# Patient Record
Sex: FEMALE | Race: WHITE | NOT HISPANIC OR LATINO | Employment: OTHER | URBAN - METROPOLITAN AREA
[De-identification: names, ages, dates, MRNs, and addresses within clinical notes are randomized per-mention and may not be internally consistent; named-entity substitution may affect disease eponyms.]

---

## 2017-03-23 ENCOUNTER — TRANSCRIBE ORDERS (OUTPATIENT)
Dept: ADMINISTRATIVE | Facility: HOSPITAL | Age: 57
End: 2017-03-23

## 2017-03-23 DIAGNOSIS — Z12.31 SCREENING MAMMOGRAM, ENCOUNTER FOR: Primary | ICD-10-CM

## 2017-04-27 ENCOUNTER — ALLSCRIPTS OFFICE VISIT (OUTPATIENT)
Dept: OTHER | Facility: OTHER | Age: 57
End: 2017-04-27

## 2017-05-09 ENCOUNTER — ALLSCRIPTS OFFICE VISIT (OUTPATIENT)
Dept: OTHER | Facility: OTHER | Age: 57
End: 2017-05-09

## 2017-05-09 ENCOUNTER — LAB REQUISITION (OUTPATIENT)
Dept: LAB | Facility: HOSPITAL | Age: 57
End: 2017-05-09
Payer: COMMERCIAL

## 2017-05-09 DIAGNOSIS — Z12.4 ENCOUNTER FOR SCREENING FOR MALIGNANT NEOPLASM OF CERVIX: ICD-10-CM

## 2017-05-09 DIAGNOSIS — Z12.31 ENCOUNTER FOR SCREENING MAMMOGRAM FOR MALIGNANT NEOPLASM OF BREAST: ICD-10-CM

## 2017-05-09 DIAGNOSIS — Z11.51 ENCOUNTER FOR SCREENING FOR HUMAN PAPILLOMAVIRUS (HPV): ICD-10-CM

## 2017-05-09 PROCEDURE — 87624 HPV HI-RISK TYP POOLED RSLT: CPT | Performed by: OBSTETRICS & GYNECOLOGY

## 2017-05-09 PROCEDURE — G0145 SCR C/V CYTO,THINLAYER,RESCR: HCPCS | Performed by: OBSTETRICS & GYNECOLOGY

## 2017-05-10 ENCOUNTER — HOSPITAL ENCOUNTER (OUTPATIENT)
Dept: MAMMOGRAPHY | Facility: MEDICAL CENTER | Age: 57
Discharge: HOME | End: 2017-05-10
Payer: COMMERCIAL

## 2017-05-10 DIAGNOSIS — Z12.31 ENCOUNTER FOR SCREENING MAMMOGRAM FOR MALIGNANT NEOPLASM OF BREAST: ICD-10-CM

## 2017-05-10 PROCEDURE — G0202 SCR MAMMO BI INCL CAD: HCPCS

## 2017-05-12 LAB — HPV RRNA GENITAL QL NAA+PROBE: NORMAL

## 2017-05-17 LAB
LAB AP GYN PRIMARY INTERPRETATION: NORMAL
Lab: NORMAL

## 2017-10-19 ENCOUNTER — GENERIC CONVERSION - ENCOUNTER (OUTPATIENT)
Dept: OTHER | Facility: OTHER | Age: 57
End: 2017-10-19

## 2017-10-24 ENCOUNTER — GENERIC CONVERSION - ENCOUNTER (OUTPATIENT)
Dept: OTHER | Facility: OTHER | Age: 57
End: 2017-10-24

## 2017-10-30 ENCOUNTER — ALLSCRIPTS OFFICE VISIT (OUTPATIENT)
Dept: OTHER | Facility: OTHER | Age: 57
End: 2017-10-30

## 2017-10-31 NOTE — PROGRESS NOTES
Assessment  1  SVT (supraventricular tachycardia) (427 89) (I47 1)   2  Hypertension (401 9) (I10)    Plan  Hypertension, SVT (supraventricular tachycardia)    · *1 - [facility] (CARDIOLOGY ) Co-Management  *  Status: Active   Requested for: 25XBC8661  Care Summary provided  : Yes   · *VB - Follow-up With Nurse or MA For BP Check Evaluation and Treatment  Follow-up   Status: Hold For - Scheduling  Requested for: 77CQD2456    Discussion/Summary    The patient appears to be doing well currently at this point since her episode of SVT last week  She was directed to continue with the amlodipine/benazepril 10/20 milligrams daily in the evening as well as the metoprolol 50 milligrams daily in the morning  She was encouraged to check her blood pressure at home as well as her pulse is 1-2 times daily until she returns next week for another blood pressure check  I reviewed with her that I would like to make sure that her blood pressure remains well controlled and does not go too low with the combination of both medications  She was encouraged to call with any problems or concerns prior to her visit next week  She was given a referral to cardiology at [facility] as she would prefer to follow up there over [facility]  Possible side effects of new medications were reviewed with the patient/guardian today  The treatment plan was reviewed with the patient/guardian  The patient/guardian understands and agrees with the treatment plan      Chief Complaint  Followup form [facility] emergency room  History of Present Illness  The patient presents today for follow-up from her ER visit  She went to the ER of [facility] on 10/24/17  She went for palpitations and her heart rate was about 230  She was given a dose of adenosine and her heart rate was down to 115  She had normal labs while she was in the emergency room including a normal magnesium, and TSH  She was not anemic   Her BMP was normal besides a potassium of 3 4  She had a negative urine pregnancy test and no evidence of a urinary tract infection  The She notes that she was given a script for metoprolol 50 mg ER daily and told to continue with the amlodipine that they thought she was taking  She notes that she saw on the discharge papers though that she was supposed to stop the amlodipine so she is confused  She states that she took her Lotrel last night and this morning took the Lopressor when she was having palpitations  She is unsure if she should continue both though  She also notes that she has an appointment with  cardiology but would prefer to be seen [facility] cardio  Review of Systems    Constitutional: not feeling poorly  Cardiovascular: no chest pain-- and-- no lower extremity edema--    The patient presents with complaints of palpitations (over the weekend had slightly but improved with medication)  Respiratory: no shortness of breath  Neurological: no headache  Active Problems  1  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)   2  Breast self examination education, encounter for (V65 49) (Z71 89)   3  Cervical cancer screening (V76 2) (Z12 4)   4  Cystocele, midline (618 01) (N81 11)   5  Dyspareunia (625 0)   6  Encounter for routine gynecological examination with Papanicolaou smear of cervix   (V72 31,V76 2) (Z01 419)   7  Encounter for screening mammogram for malignant neoplasm of breast (V76 12)   (Z12 31)   8  Headache, acute (784 0) (R51)   9  History of self breast exam   10  Hyperlipidemia (272 4) (E78 5)   11  Hypertension (401 9) (I10)   12  Screening for HPV (human papillomavirus) (V73 81) (Z11 51)   13  Seborrheic keratosis (702 19) (L82 1)   14  Tinnitus, right ear (388 30) (H93 11)   15  Urinary incontinence (788 30) (R32)   16  Uterovaginal prolapse (618 4) (N81 4)    Past Medical History  1  Breast self examination education, encounter for (V65 49) (Z71 89)   2   History of Encounter for screening colonoscopy (V76 51) (Z12 11)   3  History of  3 (V22 2) (Z33 1)   4  History of uterine leiomyoma (V13 29) (Z86 018)   5  History of Need for revaccination (V05 9) (Z23)   6  History of Need for Tdap vaccination (V06 1) (Z23)   7  Personal history of renal calculi (V13 01) (Z87 442)   8  History of Spontaneous , complete, without mention of complication (873 34)   (O03 9)   9  History of Visit for gynecologic examination (V72 31) (Z01 419)    Surgical History  1  History of Dilation And Curettage   2  History of Hysteroscopy   3  History of Laparoscopy (Diagnostic)   4  History of Oral Surgery Tooth Extraction   5  History of Salpingo-oophorectomy Left Side    Family History  Mother    1  Family history of Diabetes Mellitus (V18 0)   2  Family history of hypertension (V17 49) (Z82 49)  Father    3  Family history of hypertension (V17 49) (Z82 49)  Sister    4  Family history of hypertension (V17 49) (Z82 49)  Brother    5  Family history of hypertension (V17 49) (Z82 49)  Maternal Aunt    6  Family history of Malignant neoplasm of breast, unspecified laterality  Maternal Cousin    7  Family history of breast cancer (V16 3) (Z80 3)  Family History    8  Family history of Hypertension (V17 49)    Social History   · Being A Social Drinker   · Caffeine Use   · Denied: History of Drug use   · Former smoker (V15 82) (N51 707)   · Marital History - Currently    · Spiritism Affiliation [employer]   · Two children   · Uses Safety Equipment - Seatbelts    Current Meds   1  Amlodipine Besy-Benazepril HCl - 10-20 MG Oral Capsule; take 1 capsule daily; Therapy: 21DEH8631 to ([phone])  Requested for: 2017; Last   Rx:2017 Ordered   2  Amlodipine Besy-Benazepril HCl - 5-10 MG Oral Capsule; TAKE 1 CAPSULE EVERY DAY    Requested for: 2017; Last Rx:2017 Ordered   3  Multivitamins TABS; Therapy: (Recorded:2014) to Recorded   4  Simvastatin 20 MG Oral Tablet; Take 1 tablet daily;    Therapy: 63UGB6159 to (Evaluate:24Apr2018)  Requested for: 26Oct2017; Last   Rx:26Oct2017 Ordered   5  Toprol XL 50 MG Oral Tablet Extended Release 24 Hour; take 1 tablet every day; Therapy: (Recorded:30Oct2017) to Recorded    Allergies  1  No Known Drug Allergies  2  Latex    Vitals  Vital Signs    Recorded: 21HPU1991 03:01PM Recorded: 43TNS5194 02:39PM   Heart Rate 72 80   Systolic 311 778   Diastolic 82 80   Height  5 ft 1 5 in   Weight  141 lb 8 oz   BMI Calculated  26 3   BSA Calculated  1 64     Physical Exam    Constitutional   General appearance: No acute distress, well appearing and well nourished  Neck   Neck: Supple, symmetric, trachea midline, no masses  Thyroid: Normal, no thyromegaly  Pulmonary   Respiratory effort: No increased work of breathing or signs of respiratory distress  Auscultation of lungs: Clear to auscultation  Cardiovascular   Auscultation of heart: Normal rate and rhythm, normal S1 and S2, no murmurs  Peripheral vascular exam: Normal   Carotid: no bruit on the right-- and-- no bruit on the left  Radial: right 2+-- and-- left 2+  Posterior tibialis: right 2+-- and-- left 2+  Examination of extremities for edema and/or varicosities: Normal   no edema  Lymphatic   Palpation of lymph nodes in neck: No lymphadenopathy      Psychiatric   Mood and affect: Normal        Future Appointments    Date/Time Provider Specialty Site   11/06/2017 01:30 PM [place] Nurse Schedule  [address] [facility]     Signatures   Electronically signed by : Stiven Morales [facility]; Oct 30 2017  8:12PM EST                       (Author)    Electronically signed by : MOMO Melo ; Oct 30 2017 10:24PM EST

## 2017-11-06 ENCOUNTER — ALLSCRIPTS OFFICE VISIT (OUTPATIENT)
Dept: OTHER | Facility: OTHER | Age: 57
End: 2017-11-06

## 2017-12-06 ENCOUNTER — ALLSCRIPTS OFFICE VISIT (OUTPATIENT)
Dept: OTHER | Facility: OTHER | Age: 57
End: 2017-12-06

## 2017-12-06 ENCOUNTER — TRANSCRIBE ORDERS (OUTPATIENT)
Dept: ADMINISTRATIVE | Facility: HOSPITAL | Age: 57
End: 2017-12-06

## 2017-12-06 DIAGNOSIS — I50.32 CHRONIC DIASTOLIC HEART FAILURE (HCC): ICD-10-CM

## 2017-12-06 DIAGNOSIS — I10 ESSENTIAL HYPERTENSION, MALIGNANT: Primary | ICD-10-CM

## 2017-12-06 DIAGNOSIS — E78.5 HYPERLIPIDEMIA, UNSPECIFIED HYPERLIPIDEMIA TYPE: ICD-10-CM

## 2017-12-07 NOTE — CONSULTS
Assessment    1  SVT (supraventricular tachycardia) (427 89) (I47 1)   2  Hyperlipidemia (272 4) (E78 5)   3  Hypertension (401 9) (I10)    Plan  Hypertension    · Metoprolol Succinate ER 25 MG Oral Tablet Extended Release 24 Hour; TAKE 1TABLET DAILY   Rx By: Daphne Barrios; Dispense: 90 Days ; #:90 Tablet Extended Release 24 Hour; Refill: 3;For: Hypertension; MADISYN = N; Verified Transmission to [facility] 5496; Last Updated By: System, [employer]; 12/6/2017 9:16:49 AM   · Amlodipine Besy-Benazepril HCl - 10-20 MG Oral Capsule; take 1 capsule daily   Rx By: Daphne Barrios; Dispense: 90 Days ; #:90 Capsule; Refill: 3;For: Hypertension; MADISYN = N; Verified Transmission to [facility]); Last Updated By: System, SureScript[employer]; 12/6/2017 9:16:49 AM   · EKG/ECG- POC; Status:Complete;   Done: 92AFR8810 08:26AM   Perform: In Office; Last Updated Kayley Lopez; 12/6/2017 8:26:34 AM;Ordered;Ordered By:Tony Christie;  SVT (supraventricular tachycardia)    · ECHO STRESS TEST W CONTRAST IF INDICATED; Status:Need Information - FinancialAuthorization; Requested for:56Uzn2289 08:00AM;    Perform:[facility]; JUDE:[id number]; Last Updated By:Mariel Hays; 12/6/2017 9:32:00 AM;Ordered;(supraventricular tachycardia); Ordered By:Tony Christie;   · Follow-up visit in 6 months Evaluation and Treatment  Follow-up  Status: Complete Done: 12IJF6024   Ordered;SVT (supraventricular tachycardia); Ordered By: Daphne Barrios Performed:  Due: 02SKC3031; Last Updated By: Tiarra Colón; 12/6/2017 9:32:27 AM  Unlinked    · Toprol XL 50 MG Oral Tablet Extended Release 24 Hour (Metoprolol Succinate ER)   Dispense: 0 Days ; #: Sufficient Tablet Extended Release 24 Hour; Refill: 0; MADISYN = N; Record; Last Updated By: Daphne Barrios; 12/6/2017 9:11:47 AM    Discussion/Summary    1   SVT (records [facility] not available)  hypertension, controlled  hyperlipidemia on simvastatin  Will plan on obtaining records specifically ECG with SVT of the [facility] hospital  Presumptively, consistent with AVNRT  She has had no major symptoms requiring an ER visit in the past although she thinks she may have been added at the times before  result, will continue her current listed therapy  Discussed radiofrequency ablation should this become symptomatic or recur once again  In the meantime, echocardiogram and exercise treadmill testing will be performed and follow up arranged  Told to avoid dehydration, excessive alcohol and caffeine  Chief Complaint  New pt per pcp/ HTN      History of Present Illness  Cardiology HPI Free Text Note Form [facility]: Patient presents new patient evaluation for palpitations and reported SVT  presented [facility] with sudden onset of palpitations  She was diagnosed with SVT and given metoprolol and told to follow-up with a cardiologist   not have those records from that hospitalization but she states that she has had SVT any syncope times before  She thinks a total of 6   came on suddenly at work  It appeared to resolve with adenosine per noted her family doctor last month  has no prior cardiac history  She has a history of coronary artery disease  has not had a 2D echocardiogram in the past   does have a history of hypertension  She was on combination amlodipine and ACE inhibitor  Metoprolol recently started  states her blood pressure did get low when she went into SVT  did get lightheaded but did not have any saúl syncope  Review of Systems     Cardiac: palpitations present , but-- no chest pain,-- no rhythm problems,-- no fainting/blackouts,-- no heart murmur present-- and-- no signs of swelling  Skin: No complaints of nonhealing sores or skin rash    Genitourinary: No complaints of recurrent urinary tract infections, frequent urination at night, difficult urination, blood in urine, kidney stones, loss of bladder control, kidney problems, denies any birth control or hormone replacement, is not post menopausal, not currently pregnant  Psychological: No complaints of feeling depressed, anxiety, panic attacks, or difficulty concentrating  General: No complaints of trouble sleeping, lack of energy, fatigue, appetite changes, weight changes, fever, frequent infections, or night sweats  Respiratory: No complaints of shortness of breath, cough with sputum, or wheezing  HEENT: No complaints of serious problems, hearing problems, nose problems, throat problems, or snoring  Gastrointestinal: No complaints of liver problems, nausea, vomiting, heartburn, constipation, bloody stools, diarrhea, problems swallowing, adbominal pain, or rectal bleeding  Hematologic: No complaints of bleeding disorders, anemia, blood clots, or excessive brusing  Neurological: No complaints of numbness, tingling, dizziness, weakness, seizures, headaches, syncope or fainting, AM fatigue, daytime sleepiness, no witnessed apnea episodes  Musculoskeletal: arthritis, but-- no back pain-- and-- no swelling/pain      Active Problems    1  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)   2  Breast self examination education, encounter for (V65 49) (Z71 89)   3  Cervical cancer screening (V76 2) (Z12 4)   4  Cystocele, midline (618 01) (N81 11)   5  Dyspareunia (625 0)   6  Encounter for routine gynecological examination with Papanicolaou smear of cervix (V72 31,V76 2) (Z01 419)   7  Encounter for screening mammogram for malignant neoplasm of breast (V76 12) (Z12 31)   8  Headache, acute (784 0) (R51)   9  History of self breast exam   10  Hyperlipidemia (272 4) (E78 5)   11  Hypertension (401 9) (I10)   12  Screening for HPV (human papillomavirus) (V73 81) (Z11 51)   13  Seborrheic keratosis (702 19) (L82 1)   14  SVT (supraventricular tachycardia) (427 89) (I47 1)   15  Tinnitus, right ear (388 30) (H93 11)   16  Urinary incontinence (788 30) (R32)   17   Uterovaginal prolapse (618 4) (N81 4)    Past Medical History     · Breast self examination education, encounter for (V65 49) (Z71 89)   · History of Encounter for screening colonoscopy (V76 51) (Z12 11)   · History of  3 (V22 2) (Z33 1)   · History of uterine leiomyoma (V13 29) (Z86 018)   · History of Need for revaccination (V05 9) (Z23)   · History of Need for Tdap vaccination (V06 1) (Z23)   · Personal history of renal calculi (V13 01) (Z87 442)   · History of Spontaneous , complete, without mention of complication (692 08)(K27 6)   · History of Visit for gynecologic examination (V72 31) (Z01 419)    The active problems and past medical history were reviewed and updated today  Surgical History     · History of Dilation And Curettage   · History of Hysteroscopy   · History of Laparoscopy (Diagnostic)   · History of Oral Surgery Tooth Extraction   · History of Salpingo-oophorectomy Left Side    The surgical history was reviewed and updated today  Family History  Mother    · Family history of Diabetes Mellitus (V18 0)   · Family history of hypertension (V17 49) (Z82 49)  Father    · Family history of hypertension (V17 49) (Z82 49)  Sister    · Family history of hypertension (V17 49) (Z82 49)  Brother    · Family history of hypertension (V17 49) (Z82 49)  Maternal Aunt    · Family history of Malignant neoplasm of breast, unspecified laterality  Maternal Cousin    · Family history of breast cancer (V16 3) (Z80 3)  Family History    · Family history of Hypertension (V17 49)  Family History Reviewed: The family history was reviewed and updated today  Social History     · Being A Social Drinker   · Caffeine Use   · Denied: History of Drug use   · Former smoker (V15 82) (M87 145)   · Marital History - Currently    · [address]   · Two children   · Uses Safety Equipment - Seatbelts  The social history was reviewed and updated today  Current Meds   1   Amlodipine Besy-Benazepril HCl - 10-20 MG Oral Capsule; take 1 capsule daily; Therapy: 08ZIR0564 to ([phone])  Requested for: 26Oct2017; Last Rx:26Oct2017 Ordered   2  Multivitamins TABS; Therapy: (Recorded:02Jun2014) to Recorded   3  Simvastatin 20 MG Oral Tablet; Take 1 tablet daily; Therapy: 97UFV6774 to ([phone])  Requested for: 26Oct2017; Last Rx:26Oct2017 Ordered   4  Toprol XL 50 MG Oral Tablet Extended Release 24 Hour; TAKE 1/2 TABLET DAILY; Therapy: (450 74 295) to Recorded    The medication list was reviewed and updated today  Allergies  1  No Known Drug Allergies  2  Latex    Vitals  Signs     Heart Rate: 74  Pulse Quality: Normal, L Radial  Respiration Quality: Normal  Respiration: 16  Systolic: 340  Diastolic: 82  Height: 5 ft 1 5 in  Weight: 143 lb 2 oz  BMI Calculated: 26 61  BSA Calculated: 1 65    Physical Exam   Constitutional  General appearance: No acute distress, well appearing and well nourished  Eyes  Conjunctiva and Sclera examination: Conjunctiva pink, sclera anicteric  Ears, Nose, Mouth, and Throat - Oropharynx: Clear, nares are clear, mucous membranes are moist   Neck  Neck and thyroid: Normal, supple, trachea midline, no thyromegaly  Pulmonary  Respiratory effort: No increased work of breathing or signs of respiratory distress  Auscultation of lungs: Clear to auscultation, no rales, no rhonchi, no wheezing, good air movement  Cardiovascular  Auscultation of heart: Normal rate and rhythm, normal S1 and S2, no murmurs  Carotid pulses: Normal, 2+ bilaterally  Peripheral vascular exam: Normal pulses throughout, no tenderness, erythema or swelling  Pedal pulses: Normal, 2+ bilaterally  Examination of extremities for edema and/or varicosities: Normal    Abdomen  Abdomen: Non-tender and no distention  Liver and spleen: No hepatomegaly or splenomegaly  Musculoskeletal Gait and station: Normal gait  -- Digits and nails: Normal without clubbing or cyanosis  -- Inspection/palpation of joints, bones, and muscles: Normal, ROM normal    Skin - Skin and subcutaneous tissue: Normal without rashes or lesions  Skin is warm and well perfused, normal turgor  Neurologic - Cranial nerves: II - XII intact  -- Speech: Normal    Psychiatric - Orientation to person, place, and time: Normal -- Mood and affect: Normal       End of Encounter Meds    1  Simvastatin 20 MG Oral Tablet; Take 1 tablet daily; Therapy: 16YNQ5004 to (Osbaldo Irving)  Requested for: 26Oct2017; Last Rx:26Oct2017 Ordered    2  Amlodipine Besy-Benazepril HCl - 10-20 MG Oral Capsule; take 1 capsule daily; Therapy: 21YBR7222 to ([place])  Requested for: 30CGO0747; Last Rx:14Cbi2454 Ordered   3  Metoprolol Succinate ER 25 MG Oral Tablet Extended Release 24 Hour; TAKE 1 TABLET DAILY; Therapy: 13MJM1399 to ([place])  Requested for: 49XKP4054; Last Rx:82Qmo2129 Ordered    4  Multivitamins TABS;  Therapy: (Recorded:02Jun2014) to Recorded    Signatures   Electronically signed by : Jaye Pierson DO; Dec  6 2017  1:10PM EST                       (Author)

## 2017-12-22 ENCOUNTER — GENERIC CONVERSION - ENCOUNTER (OUTPATIENT)
Dept: CARDIOLOGY CLINIC | Facility: CLINIC | Age: 57
End: 2017-12-22

## 2017-12-22 ENCOUNTER — HOSPITAL ENCOUNTER (OUTPATIENT)
Dept: NON INVASIVE DIAGNOSTICS | Facility: CLINIC | Age: 57
Discharge: HOME | End: 2017-12-22
Payer: COMMERCIAL

## 2017-12-22 DIAGNOSIS — I47.1 SUPRAVENTRICULAR TACHYCARDIA (HCC): ICD-10-CM

## 2017-12-22 PROCEDURE — 93350 STRESS TTE ONLY: CPT

## 2017-12-27 ENCOUNTER — GENERIC CONVERSION - ENCOUNTER (OUTPATIENT)
Dept: OTHER | Facility: OTHER | Age: 57
End: 2017-12-27

## 2018-01-09 NOTE — PROGRESS NOTES
Chief Complaint  Blood pressure check  122/68  Pt stopped taking Toprol XL c/o low blood pressure and lightheaded  Pt took only 2 days  Pt would like to know if she should continue taking Toprol XL but lower dosage  Active Problems    1  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)   2  Breast self examination education, encounter for (V65 49) (Z71 89)   3  Cervical cancer screening (V76 2) (Z12 4)   4  Cystocele, midline (618 01) (N81 11)   5  Dyspareunia (625 0)   6  Encounter for routine gynecological examination with Papanicolaou smear of cervix   (V72 31,V76 2) (Z01 419)   7  Encounter for screening mammogram for malignant neoplasm of breast (V76 12)   (Z12 31)   8  Headache, acute (784 0) (R51)   9  History of self breast exam   10  Hyperlipidemia (272 4) (E78 5)   11  Hypertension (401 9) (I10)   12  Screening for HPV (human papillomavirus) (V73 81) (Z11 51)   13  Seborrheic keratosis (702 19) (L82 1)   14  SVT (supraventricular tachycardia) (427 89) (I47 1)   15  Tinnitus, right ear (388 30) (H93 11)   16  Urinary incontinence (788 30) (R32)   17  Uterovaginal prolapse (618 4) (N81 4)    Current Meds   1  Amlodipine Besy-Benazepril HCl - 10-20 MG Oral Capsule; take 1 capsule daily; Therapy: 07OLS5681 to (60 124 37 75)  Requested for: 26Oct2017; Last   Rx:26Oct2017 Ordered   2  Multivitamins TABS; Therapy: (Recorded:02Jun2014) to Recorded   3  Simvastatin 20 MG Oral Tablet; Take 1 tablet daily; Therapy: 26LGE3592 to (60 124 37 75)  Requested for: 26Oct2017; Last   Rx:26Oct2017 Ordered   4  Toprol XL 50 MG Oral Tablet Extended Release 24 Hour; take 1 tablet every day; Therapy: (Recorded:30Oct2017) to Recorded    Allergies    1  No Known Drug Allergies    2   Latex    Vitals  Signs    Systolic: 783, LUE, Sitting  Diastolic: 68, LUE, Sitting    Discussion/Summary    Decrease metoprolol succinate 50 milligrams to half tablet daily (for a total of 25 milligrams daily) and continue with the amlodipine benazepril 10/20 milligrams daily - return for blood pressure check in 1-2 weeks        Future Appointments    Date/Time Provider Specialty Site   12/06/2017 08:20 AM Roger Yeh,  Cardiology [address] [facility]     Signatures   Electronically signed by : Lara Murguia, [facility]; Nov 6 2017  6:25PM EST                       (Author)    Electronically signed by : MOMO Ennis ; Nov 6 2017  8:20PM EST

## 2018-01-13 VITALS
HEIGHT: 62 IN | DIASTOLIC BLOOD PRESSURE: 82 MMHG | HEART RATE: 72 BPM | WEIGHT: 141.5 LBS | SYSTOLIC BLOOD PRESSURE: 136 MMHG | BODY MASS INDEX: 26.04 KG/M2

## 2018-01-14 VITALS — SYSTOLIC BLOOD PRESSURE: 122 MMHG | DIASTOLIC BLOOD PRESSURE: 68 MMHG

## 2018-01-15 VITALS
DIASTOLIC BLOOD PRESSURE: 74 MMHG | WEIGHT: 138.25 LBS | HEIGHT: 62 IN | BODY MASS INDEX: 25.44 KG/M2 | SYSTOLIC BLOOD PRESSURE: 116 MMHG

## 2018-01-17 NOTE — PROGRESS NOTES
Assessment    1  Former smoker (V15 82) (P90 125)   2  Encounter for preventive health examination (V70 0) (Z00 00)   3  Hypertension (401 9) (I10)   4  Seborrheic keratosis (702 19) (L82 1)   5  Tinnitus, right ear (388 30) (H93 11)    Plan  Hyperlipidemia    · From  Lotrel 5-10 MG Oral Capsule  To Amlodipine Besy-Benazepril HCl -  5-10 MG Oral Capsule (Lotrel) TAKE 1 CAPSULE EVERY DAY  Need for revaccination    · Stop: Fluzone Quadrivalent 0 5 ML Intramuscular Suspension  Seborrheic keratosis    · 2 - Bayron Lou MD, Erlin Gilliland  (Dermatology) Co-Management  *  Status: Complete  Done:  27Apr2017  Care Summary provided  : Yes    Discussion/Summary  health maintenance visit Currently, she eats a healthy diet  Breast cancer screening: mammogram is current  Colorectal cancer screening: colorectal cancer screening is current  The immunizations are up to date  Samir Miller is here for health maintenance visit  She has been doing very well and has lost weight since last year  She is maintaining the weight loss with healthy diet and exercise  Blood pressure is well controlled on her current medication  She had a very good lipid profile last year, so we will recheck this in the future probably next year  She is having some clicking in the right ear intermittently  I recommended keeping a symptom diary of the symptoms and if it is worsening I will give her referral to ENT  She also has mild skin lesion in the center of the chest which is likely a seborrheic keratosis  There is a positive family history of melanoma in her father, and I've recommended dermatology referral   We'll plan yearly health maintenance visit  Chief Complaint  yearly - discuss clicking noise in right ear that occurs when she is laying down intermittently- also spot on middle of upper chest      History of Present Illness  HM, Adult Female: The patient is being seen for a health maintenance evaluation  The last health maintenance visit was 1 year(s) ago  General Health: The patient's health since the last visit is described as good  She has regular dental visits  She denies vision problems  She denies hearing loss  Immunizations status:  wears glasses for  Lifestyle:  She consumes a diverse and healthy diet  She exercises regularly  Reproductive health: the patient is postmenopausal    Screening:   HPI: yearly physical  Notes clicking in right ear when she lies down in quiet room for past 2 months  Sx is intermittent  no pain or hearing loss   no allergy sx or nasal congestion  post menopausal for over 10 yrs but still with hot flashes  Rare palpitations  Lost weight with healthy diet and exercise  last yr had tingling in hands subsided with B12      Review of Systems    Constitutional: No fever, no chills, feels well, no tiredness, no recent weight gain or weight loss  Eyes: No complaints of eye pain, no red eyes, no eyesight problems, no discharge, no dry eyes, no itching of eyes  ENT: as noted in HPI  Cardiovascular: No complaints of slow heart rate, no fast heart rate, no chest pain, no palpitations, no leg claudication, no lower extremity edema  Respiratory: No complaints of shortness of breath, no wheezing, no cough, no SOB on exertion, no orthopnea, no PND  Gastrointestinal: No complaints of abdominal pain, no constipation, no nausea or vomiting, no diarrhea, no bloody stools  Genitourinary: No complaints of dysuria, no incontinence, no pelvic pain, no dysmenorrhea, no vaginal discharge or bleeding  Musculoskeletal: No complaints of arthralgias, no myalgias, no joint swelling or stiffness, no limb pain or swelling  Integumentary: skin lesion  Neurological: No complaints of headache, no confusion, no convulsions, no numbness, no dizziness or fainting, no tingling, no limb weakness, no difficulty walking  Psychiatric: Not suicidal, no sleep disturbance, no anxiety or depression, no change in personality, no emotional problems  Endocrine: hot flashes  Active Problems    1  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)   2  Breast self examination education, encounter for (V65 49) (Z71 89)   3  Cystocele, midline (618 01) (N81 11)   4  Dyspareunia (625 0)   5  Encounter for screening colonoscopy (V76 51) (Z12 11)   6  Encounter for screening mammogram for malignant neoplasm of breast (V76 12)   (Z12 31)   7  Headache, acute (784 0) (R51)   8  History of self breast exam   9  Hyperlipidemia (272 4) (E78 5)   10  Hypertension (401 9) (I10)   11  Need for revaccination (V05 9) (Z23)   12  Need for Tdap vaccination (V06 1) (Z23)   13  Urinary incontinence (788 30) (R32)   14  Uterovaginal prolapse (618 4) (N81 4)   15   Visit for gynecologic examination (V72 31) (Z01 419)    Past Medical History    · Breast self examination education, encounter for (V65 49) (Z71 89)   · History of  3 (V22 2) (Z33 1)   · History of uterine leiomyoma (V13 29) (Z86 018)   · Personal history of renal calculi (V13 01) (Z87 442)   · History of Spontaneous , complete, without mention of complication (690 72)  (U40 2)    Surgical History    · History of Dilation And Curettage   · History of Hysteroscopy   · History of Laparoscopy (Diagnostic)   · History of Oral Surgery Tooth Extraction   · History of Salpingo-oophorectomy Left Side    Family History  Mother    · Family history of Diabetes Mellitus (V18 0)   · Family history of hypertension (V17 49) (Z82 49)  Father    · Family history of hypertension (V17 49) (Z82 49)  Sister    · Family history of hypertension (V17 49) (Z82 49)  Brother    · Family history of hypertension (V17 49) (Z82 49)  Maternal Aunt    · Family history of Malignant neoplasm of breast, unspecified laterality  Maternal Cousin    · Family history of breast cancer (V16 3) (Z80 3)  Family History    · Family history of Hypertension (V17 49)    Social History    · Being A Social Drinker   · Caffeine Use   · Denied: History of Drug use   · Former smoker (V15 82) (U92 600)   · Marital History - Currently    · Confucianist Affiliation Mu-ism   · Two children   · Uses Safety Equipment - Seatbelts    Current Meds   1  Amlodipine Besy-Benazepril HCl - 10-20 MG Oral Capsule; take 1 capsule daily; Therapy: [id number] to (Evaluate:34Hty2788)  Requested for: 14CZJ6665; Last   Rx:23Mar2017 Ordered   2  Lotrel 5-10 MG Oral Capsule; Therapy: (Recorded:16Mar2016) to Recorded   3  Multivitamins TABS; Therapy: (Recorded:02Jun2014) to Recorded    Allergies    1  No Known Drug Allergies    2  Latex    Vitals   Recorded: 71SQF5837 15:66CZ   Systolic 923   Diastolic 84   Height 5 ft 1 5 in   Weight 136 lb 6 oz   BMI Calculated 25 35   BSA Calculated 1 61     Physical Exam    Constitutional   General appearance: No acute distress, well appearing and well nourished  Head and Face   Head and face: Normal     Palpation of the face and sinuses: No sinus tenderness  Eyes   Conjunctiva and lids: No swelling, erythema or discharge  Pupils and irises: Equal, round, reactive to light  Ears, Nose, Mouth, and Throat   External inspection of ears and nose: Normal     Otoscopic examination: Tympanic membranes translucent with normal light reflex  Canals patent without erythema  Hearing: Normal     Lips, teeth, and gums: Normal, good dentition  Oropharynx: Normal with no erythema, edema, exudate or lesions  Neck   Neck: Supple, symmetric, trachea midline, no masses  Thyroid: Normal, no thyromegaly  Pulmonary   Respiratory effort: No increased work of breathing or signs of respiratory distress  Auscultation of lungs: Clear to auscultation  Cardiovascular   Auscultation of heart: Normal rate and rhythm, normal S1 and S2, no murmurs  Carotid pulses: 2+ bilaterally  Abdominal aorta: Normal     Pedal pulses: 2+ bilaterally  Examination of extremities for edema and/or varicosities: Normal     Abdomen   Abdomen: Non-tender, no masses  Liver and spleen: No hepatomegaly or splenomegaly  Results/Data  PHQ-2 Adult Depression Screening 27Apr2017 08:24AM User, Ahs     Test Name Result Flag Reference   PHQ-2 Adult Depression Score 0     Over the last two weeks, how often have you been bothered by any of the following problems? Little interest or pleasure in doing things: Not at all - 0  Feeling down, depressed, or hopeless: Not at all - 0   PHQ-2 Adult Depression Screening Negative         Future Appointments    Date/Time Provider Specialty Site   05/09/2017 01:15 PM MOMO Cortez   Obstetrics/Gynecology [facility]     Signatures   Electronically signed by : Rajiv Perez MD; Apr 27 2017  3:58PM EST                       (Author)

## 2018-01-22 VITALS
BODY MASS INDEX: 26.34 KG/M2 | RESPIRATION RATE: 16 BRPM | HEART RATE: 74 BPM | WEIGHT: 143.13 LBS | HEIGHT: 62 IN | SYSTOLIC BLOOD PRESSURE: 130 MMHG | DIASTOLIC BLOOD PRESSURE: 82 MMHG

## 2018-01-22 VITALS
DIASTOLIC BLOOD PRESSURE: 84 MMHG | BODY MASS INDEX: 25.1 KG/M2 | HEIGHT: 62 IN | SYSTOLIC BLOOD PRESSURE: 140 MMHG | WEIGHT: 136.38 LBS

## 2018-01-23 NOTE — RESULT NOTES
Verified Results  ECHO STRESS TEST W CONTRAST IF INDICATED [id number] 07:56AM Russell Delgado Order Number: [id number]    - Patient Instructions: To schedule this appointment, please contact Central Scheduling at [phone]  Test Name Result Flag Reference   ECHO STRESS TEST W CONTRAST IF INDICATED (Report)     [facility]   [place], [address]   [phone]     Exercise Stress Echocardiography     Study date: 22-Dec-2017     Patient: Kalie VO number: [de-identified]   Account number: [de-identified]   : 1960   Age: 59 years   Gender: Female   Study date: 22-Dec-2017   Status: Outpatient   Location: [facility] lab   Height: 61 in   Weight: 143 lb   BP: 136// 74 mmHg     Indications: Detection of coronary artery disease  Arrhythmia     Diagnosis: I49 9 - Cardiac arrhythmia, unspecified     Sonographer: SKYLAR Villalobos   Primary Physician: Berta Lopez MD   Referring Physician: Neeru Chaidez DO   Group: [facility]   RN: Lawana Nyhan, RN BSN   Interpreting Physician: Karin Bravo MD     IMPRESSIONS:   Normal study after maximal exercise without reproduction of symptoms  Left ventricular systolic function was normal      SUMMARY     STRESS RESULTS:   Duration of exercise was 10 min and 30 sec  Maximal work rate was 12 5 METs  Maximal heart rate during stress was 162 bpm ( 100 % of maximal predicted heart rate)  Target heart rate was achieved  There was no chest pain during stress  ECG CONCLUSIONS:   The stress ECG was negative for ischemia  There were no stress arrhythmias or conduction abnormalities  BASELINE:   There were no regional wall motion abnormalities  Estimated left ventricular ejection fraction was 60 %   PEAK STRESS:   There were no regional wall motion abnormalities       ECHO CONCLUSIONS:   There was no echocardiographic evidence for stress-induced ischemia  HISTORY: The patient is a 59year old female  Chest pain status: no chest pain  Other symptoms: palpitations  Coronary artery disease risk factors: dyslipidemia and hypertension  Cardiovascular history: arrhythmia  Medications: a beta   blocker, a calcium channel blocker, and a lipid lowering agent  REST ECG: Sinus rhythm without any significant resting abnormalities  PROCEDURE: The study was performed in the stress lab  The study was performed in the [address] [place] [address]  The procedure was explained to the patient and informed consent was obtained  Treadmill exercise testing was performed,   using the Aparna protocol  Stress and rest echocardiographic evaluation with 2D imaging, spectral Doppler, and color Doppler was performed from multiple acoustic windows for evaluation of ventricular function  APARNA PROTOCOL:   HR bpm SBP mmHg DBP mmHg Symptoms   Baseline 86 136 74 none   Stage 1 117 144 80 --   Stage 2 131 160 82 --   Stage 3 148 178 76 --   Stage 4 162 -- -- --   Immediate 162 178 76 --   Recovery 2 122 136 72 --   Recovery 5 106 126 70 --     MEDICATIONS GIVEN: No medications or fluids given  STRESS RESULTS: Duration of exercise was 10 min and 30 sec  The patient exercised to protocol stage 4  Maximal work rate was 12 5 METs  Maximal heart rate during stress was 162 bpm ( 100 % of maximal predicted heart rate)  Target heart   rate was achieved  The heart rate response to stress was normal  Maximal systolic blood pressure during stress was 178 mmHg  There was normal resting blood pressure with an appropriate response to stress  The rate-pressure product for the   peak heart rate and blood pressure was 96474  There was no chest pain during stress  The stress test was terminated due to achievement of target heart rate  ECG CONCLUSIONS: The stress ECG was negative for ischemia  There were no stress arrhythmias or conduction abnormalities       STRESS 2D ECHO RESULTS:     BASELINE: There were no regional wall motion abnormalities  Left ventricular size was normal  Overall left ventricular systolic function was normal  Estimated left ventricular ejection fraction was 60 %   PEAK STRESS: There were no regional wall motion abnormalities  There was an appropriate reduction in left ventricular size  There was an appropriate augmentation in LV function  ECHO CONCLUSIONS: There was no echocardiographic evidence for stress-induced ischemia  The image quality was fair       Prepared and electronically signed by     Kristin Olmos MD   Signed [id number] 12:00:01

## 2018-03-07 NOTE — PROGRESS NOTES
History of Present Illness    Revaccination   Vaccine Information: Vaccine(s) Given (names): adacel 3/16/2016  Spoke with patient regarding risks and benefits of revaccination  Action(s): Pt will be revaccinated  Appointment scheduled: [id number]  Pt called (attempt 1): [phone] pp  Other Information: Spoke with pt and she will revaccinate but will wait until her next appt  Revaccination Completed: [id number]  Active Problems    1  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)   2  Breast self examination education, encounter for (V65 49) (Z71 89)   3  Cystocele, midline (618 01) (N81 11)   4  Dyspareunia (625 0)   5  Encounter for screening colonoscopy (V76 51) (Z12 11)   6  Encounter for screening mammogram for malignant neoplasm of breast (V76 12)   (Z12 31)   7  Headache, acute (784 0) (R51)   8  History of self breast exam   9  Hyperlipidemia (272 4) (E78 5)   10  Need for Tdap vaccination (V06 1) (Z23)   11  Urinary incontinence (788 30) (R32)   12  Uterovaginal prolapse (618 4) (N81 4)   13  Visit for gynecologic examination (V72 31) (Z01 419)    Immunizations  Influenza --- [place]: Temporarily Deferred: Pt refuses, As of: 21VRF8707, Defer for 1 Years   Tdap --- Series1: 16-Mar-2016     Current Meds   1  Amlodipine Besy-Benazepril HCl - 10-20 MG Oral Capsule; TAKE 1 CAPSULE DAILY   2  Lotrel 5-10 MG Oral Capsule   3  Multivitamins TABS    Allergies    1  No Known Drug Allergies    2  Latex    Plan    1  RVAC-Adacel 5-2-15 5 LF-MCG/0 5 Intramuscular Suspension    Education  Education Items with no Session   RVAC-Adacel 5-2-15 5 LF-MCG/0 5 Intramuscular Suspension;  Provided: [id number]  08:29AM; Counselor: Dexter Reddy; Future Appointments    Date/Time Provider Specialty Site   05/09/2017 01:15 PM MOMO [place]   Obstetrics/Gynecology [facility]     Signatures   Electronically signed by : Jay Parson MD; Apr 27 2017  4:44PM EST                       (Author)

## 2018-04-23 DIAGNOSIS — E78.01 FAMILIAL HYPERCHOLESTEROLEMIA: Primary | ICD-10-CM

## 2018-04-24 RX ORDER — SIMVASTATIN 20 MG
TABLET ORAL
Qty: 90 TABLET | Refills: 0 | Status: SHIPPED | OUTPATIENT
Start: 2018-04-24 | End: 2018-08-18 | Stop reason: SDUPTHER

## 2018-06-06 RX ORDER — METOPROLOL SUCCINATE 50 MG/1
0.5 TABLET, EXTENDED RELEASE ORAL DAILY
COMMUNITY
End: 2018-06-07 | Stop reason: ALTCHOICE

## 2018-06-06 RX ORDER — AMLODIPINE BESYLATE AND BENAZEPRIL HYDROCHLORIDE 10; 20 MG/1; MG/1
1 CAPSULE ORAL DAILY
COMMUNITY
Start: 2016-03-16 | End: 2018-11-06 | Stop reason: SDUPTHER

## 2018-06-06 RX ORDER — MENTHOL 5.8 MG/1
LOZENGE ORAL
COMMUNITY

## 2018-06-06 RX ORDER — METOPROLOL SUCCINATE 25 MG/1
1 TABLET, EXTENDED RELEASE ORAL DAILY
COMMUNITY
Start: 2017-12-06 | End: 2019-01-24 | Stop reason: SDUPTHER

## 2018-06-07 ENCOUNTER — OFFICE VISIT (OUTPATIENT)
Dept: FAMILY MEDICINE CLINIC | Facility: CLINIC | Age: 58
End: 2018-06-07
Payer: COMMERCIAL

## 2018-06-07 VITALS
WEIGHT: 148.8 LBS | DIASTOLIC BLOOD PRESSURE: 78 MMHG | BODY MASS INDEX: 27.38 KG/M2 | RESPIRATION RATE: 16 BRPM | SYSTOLIC BLOOD PRESSURE: 122 MMHG | HEIGHT: 62 IN | HEART RATE: 76 BPM | TEMPERATURE: 98.3 F

## 2018-06-07 DIAGNOSIS — I47.1 SVT (SUPRAVENTRICULAR TACHYCARDIA) (HCC): ICD-10-CM

## 2018-06-07 DIAGNOSIS — Z00.00 ROUTINE ADULT HEALTH MAINTENANCE: Primary | ICD-10-CM

## 2018-06-07 DIAGNOSIS — Z11.59 NEED FOR HEPATITIS C SCREENING TEST: ICD-10-CM

## 2018-06-07 DIAGNOSIS — E78.01 FAMILIAL HYPERCHOLESTEROLEMIA: ICD-10-CM

## 2018-06-07 DIAGNOSIS — I10 ESSENTIAL HYPERTENSION: ICD-10-CM

## 2018-06-07 DIAGNOSIS — R73.9 HYPERGLYCEMIA: ICD-10-CM

## 2018-06-07 PROBLEM — I47.10 SVT (SUPRAVENTRICULAR TACHYCARDIA): Status: ACTIVE | Noted: 2017-10-30

## 2018-06-07 PROBLEM — L82.1 SEBORRHEIC KERATOSIS: Status: ACTIVE | Noted: 2017-04-27

## 2018-06-07 PROCEDURE — 99396 PREV VISIT EST AGE 40-64: CPT | Performed by: FAMILY MEDICINE

## 2018-06-07 NOTE — ASSESSMENT & PLAN NOTE
She had a bout of SVT in October 2017  She has not had any recurrences and feels well on metoprolol 25 mg daily  She will continue follow-up with Cardiology

## 2018-06-07 NOTE — PATIENT INSTRUCTIONS
Hypertension    Blood pressure is under excellent control on current regimen  Hyperlipidemia    Will check fasting lipid profile  SVT (supraventricular tachycardia) ([facility] [address] )    She had a bout of SVT in October 2017  She has not had any recurrences and feels well on metoprolol 25 mg daily  She will continue follow-up with Cardiology  There is a new shingles vaccine available called Shingrix  It is recommended after age 48, even if you have already had the Zostavax shingles vaccine  First you should contact your insurance to make sure it is covered, and then you can go to the pharmacy for the vaccine  There are 2 doses of vaccine  by 2-6 months

## 2018-06-07 NOTE — PROGRESS NOTES
Assessment/Plan:    Hypertension    Blood pressure is under excellent control on current regimen  Hyperlipidemia    Will check fasting lipid profile  SVT (supraventricular tachycardia) ([facility] [address] )    She had a bout of SVT in October 2017  She has not had any recurrences and feels well on metoprolol 25 mg daily  She will continue follow-up with Cardiology  Diagnoses and all orders for this visit:    Routine adult health maintenance    Essential hypertension    Familial hypercholesterolemia    SVT (supraventricular tachycardia) ([facility] [address] )    Other orders  -     amLODIPine-benazepril (LOTREL) 10-20 MG per capsule; Take 1 capsule by mouth daily  -     metoprolol succinate (TOPROL-XL) 25 mg 24 hr tablet; Take 1 tablet by mouth daily  -     Multiple Vitamins-Iron (QC DAILY MULTIVITAMINS/IRON) TABS; Take by mouth  -     Discontinue: metoprolol succinate (TOPROL XL) 50 mg 24 hr tablet; Take 0 5 tablets by mouth daily          Subjective:      Patient ID: Katie Webster is a 59 y o  female  She is here for wellness visit  She has generally felt well over the past yr  Diet is generally well balanced  She has noted 10 lb weight gain over winter  She attributes this to excess snacking at night  She exercises 5 or 6 times weekly for 30 min  She is UTD with dentist and eye doctor  She is due for colonoscopy this yr will schedule  Had bout of SVT in October  She went to ER and adenosine broke the rhythm  Neg stress echo and she has felt Okay on metoprolol 25 mg daily  Left thumb pain started recently  She is wearing brace  She just found out her father is diabetic    Her sister is borderline  Glucose in the fall was mildly elevated        The following portions of the patient's history were reviewed and updated as appropriate: allergies, current medications, past family history, past medical history, past social history, past surgical history and problem list     Review of Systems   Constitutional: Negative for activity change, chills, fatigue and fever  HENT: Negative for congestion, ear pain, sinus pressure and sore throat  Eyes: Negative for pain and visual disturbance  Respiratory: Negative for cough, chest tightness, shortness of breath and wheezing  Cardiovascular: Negative for chest pain, palpitations and leg swelling  Gastrointestinal: Negative for abdominal pain, blood in stool, constipation, diarrhea, nausea and vomiting  Endocrine: Negative for polydipsia and polyuria  Genitourinary: Negative for difficulty urinating, dysuria, frequency and urgency  Musculoskeletal: Positive for arthralgias  Negative for joint swelling and myalgias  Skin: Negative for rash  Neurological: Negative for dizziness, weakness, numbness and headaches  Hematological: Negative for adenopathy  Does not bruise/bleed easily  Psychiatric/Behavioral: Negative for dysphoric mood  The patient is not nervous/anxious  Objective:      /78   Pulse 76   Temp 98 3 °F (36 8 °C) (Tympanic)   Resp 16   Ht 5' 1 5" (1 562 m)   Wt 67 5 kg (148 lb 12 8 oz)   BMI 27 66 kg/m²          Physical Exam   Constitutional: She is oriented to person, place, and time  She appears well-developed and well-nourished  HENT:   Head: Normocephalic and atraumatic  Eyes: Conjunctivae and EOM are normal  Pupils are equal, round, and reactive to light  Neck: Normal range of motion  Neck supple  No thyromegaly present  Cardiovascular: Normal rate, regular rhythm and normal heart sounds  Pulmonary/Chest: Effort normal and breath sounds normal    Abdominal: Soft  Bowel sounds are normal    Musculoskeletal: Normal range of motion  She exhibits no edema or deformity  Left thumb without swelling or pain, full ROM   Lymphadenopathy:     She has no cervical adenopathy  Neurological: She is alert and oriented to person, place, and time  She has normal reflexes  Skin: Skin is warm and dry  No rash noted     Psychiatric: She has a normal mood and affect  Her behavior is normal  Judgment and thought content normal    Nursing note and vitals reviewed

## 2018-06-28 LAB
ALBUMIN SERPL-MCNC: 4.6 G/DL (ref 3.6–5.1)
ALBUMIN/GLOB SERPL: 1.7 (CALC) (ref 1–2.5)
ALP SERPL-CCNC: 74 U/L (ref 33–130)
ALT SERPL-CCNC: 25 U/L (ref 6–29)
AST SERPL-CCNC: 28 U/L (ref 10–35)
BILIRUB SERPL-MCNC: 0.6 MG/DL (ref 0.2–1.2)
BUN SERPL-MCNC: 12 MG/DL (ref 7–25)
BUN/CREAT SERPL: NORMAL (CALC) (ref 6–22)
CALCIUM SERPL-MCNC: 9.8 MG/DL (ref 8.6–10.4)
CHLORIDE SERPL-SCNC: 103 MMOL/L (ref 98–110)
CHOLEST SERPL-MCNC: 212 MG/DL
CHOLEST/HDLC SERPL: 3.4 (CALC)
CO2 SERPL-SCNC: 27 MMOL/L (ref 20–31)
CREAT SERPL-MCNC: 0.88 MG/DL (ref 0.5–1.05)
GLOBULIN SER CALC-MCNC: 2.7 G/DL (CALC) (ref 1.9–3.7)
GLUCOSE SERPL-MCNC: 97 MG/DL (ref 65–99)
HBA1C MFR BLD: 5.4 % OF TOTAL HGB
HCV AB S/CO SERPL IA: 0.01
HCV AB SERPL QL IA: NORMAL
HDLC SERPL-MCNC: 63 MG/DL
LDLC SERPL CALC-MCNC: 128 MG/DL (CALC)
NONHDLC SERPL-MCNC: 149 MG/DL (CALC)
POTASSIUM SERPL-SCNC: 4.7 MMOL/L (ref 3.5–5.3)
PROT SERPL-MCNC: 7.3 G/DL (ref 6.1–8.1)
SL AMB EGFR AFRICAN AMERICAN: 83 ML/MIN/1.73M2
SL AMB EGFR NON AFRICAN AMERICAN: 72 ML/MIN/1.73M2
SODIUM SERPL-SCNC: 140 MMOL/L (ref 135–146)
TRIGL SERPL-MCNC: 100 MG/DL

## 2018-08-18 DIAGNOSIS — E78.01 FAMILIAL HYPERCHOLESTEROLEMIA: ICD-10-CM

## 2018-08-19 RX ORDER — SIMVASTATIN 20 MG
TABLET ORAL
Qty: 90 TABLET | Refills: 0 | Status: SHIPPED | OUTPATIENT
Start: 2018-08-19 | End: 2018-11-06 | Stop reason: SDUPTHER

## 2018-09-19 ENCOUNTER — ANNUAL EXAM (OUTPATIENT)
Dept: OBGYN CLINIC | Facility: CLINIC | Age: 58
End: 2018-09-19
Payer: COMMERCIAL

## 2018-09-19 VITALS
WEIGHT: 139.8 LBS | SYSTOLIC BLOOD PRESSURE: 140 MMHG | BODY MASS INDEX: 25.73 KG/M2 | HEIGHT: 62 IN | DIASTOLIC BLOOD PRESSURE: 82 MMHG

## 2018-09-19 DIAGNOSIS — N81.11 MIDLINE CYSTOCELE: Primary | ICD-10-CM

## 2018-09-19 DIAGNOSIS — N95.2 ATROPHY OF VAGINA: ICD-10-CM

## 2018-09-19 DIAGNOSIS — Z12.31 VISIT FOR SCREENING MAMMOGRAM: ICD-10-CM

## 2018-09-19 DIAGNOSIS — Z01.419 WOMEN'S ANNUAL ROUTINE GYNECOLOGICAL EXAMINATION: ICD-10-CM

## 2018-09-19 PROCEDURE — 99396 PREV VISIT EST AGE 40-64: CPT | Performed by: OBSTETRICS & GYNECOLOGY

## 2018-09-19 NOTE — PATIENT INSTRUCTIONS
Vaginal Atrophy   WHAT YOU NEED TO KNOW:   What is vaginal atrophy? Vaginal atrophy is a condition that causes thinning, drying, and inflammation of vaginal tissue  This condition is caused by decreased levels of estrogen (a female sex hormone)  Vaginal atrophy can increase your risk for vaginal and urinary tract infections  Vaginal atrophy can worsen over time if not treated  What causes or increases your risk of vaginal atrophy? · Menopause     · Medicines that lower your estrogen levels, such as those used to treat breast cancer, endometriosis, or fibroids    · Radiation to your pelvic area     · Surgery to remove the ovaries    · Breastfeeding  What are the signs and symptoms of vaginal atrophy? · Vaginal dryness, itching, and burning    · Vaginal discharge    · Pain or discomfort during sex    · Light bleeding after sex    · Burning during urination    · Frequent, sudden, strong urges to urinate    · Urinary incontinence (loss of control of your bladder)  How is vaginal atrophy diagnosed? Your healthcare provider will ask about your symptoms  A pelvic exam will be done to examine your vagina and cervix  Your healthcare provider will place a speculum into your vagina to open and examine it  A sample of discharge from your vagina may be collected and tested  A urine test may also be done  How is vaginal atrophy treated? · Over-the counter vaginal moisturizers  can help reduce dryness  Your healthcare provider may recommend that you use a vaginal moisturizer several times each week and during sex  Only use creams that are made for vaginal use  Do  not  use petroleum jelly  Lubricants can be used during sex to decrease pain and discomfort  · Estrogen  may help decrease dryness  It may also lower your risk of vaginal infections if you are going through menopause  It can also help to relieve urinary symptoms  Estrogen may be prescribed in the form of a cream, tablet, or ring   These medicines can be applied or inserted into the vagina  Estrogen can also be prescribed in the form of a pill  When should I contact my healthcare provider? · You have a foul-smelling odor coming from your vagina  · You have a thick, cheese-like discharge from your vagina  · You have itching, swelling, or redness in your vagina  · You have pain or burning when you urinate  · Your urine smells bad  · Your symptoms do not improve, or they get worse  · You have questions or concerns about your condition or care  CARE AGREEMENT:   You have the right to help plan your care  Learn about your health condition and how it may be treated  Discuss treatment options with your caregivers to decide what care you want to receive  You always have the right to refuse treatment  The above information is an  only  It is not intended as medical advice for individual conditions or treatments  Talk to your doctor, nurse or pharmacist before following any medical regimen to see if it is safe and effective for you  © 2017 [employer] Information is for End User's use only and may not be sold, redistributed or otherwise used for commercial purposes  All illustrations and images included in CareNotes® are the copyrighted property of A [employer]  or Samson Lopez

## 2018-09-19 NOTE — PROGRESS NOTES
Assessment/Plan:  1  Yearly exam-Pap smear deferred, self-breast awareness reviewed, calcium/vitamin-D recommendations discussed, mammogram request given, colonoscopy as per specialist due soon  2   Uterovaginal prolapse-grade 1 cystocele and rectocele are noted, quite minimal   Patient is asymptomatic  No definite cervical/uterine prolapse is noted on exam today  She will call or return with any symptoms  3   History of uterine myomas-non palpable on exam   No intervention is recommended  4   Mild vaginal atrophy-noted on exam today  Patient is without symptoms  She will call with any issues  5   History of urinary incontinence-none noted  6   Fany Maldonado is a patient of mine now 31 weeks pregnancy  My congratulations were given  She will follow-up in 1 year or as needed  No problem-specific Assessment & Plan notes found for this encounter  There are no diagnoses linked to this encounter  Subjective:      Patient ID: Ana Schultz is a 59 y o  female  Patient was seen today for yearly exam   Please see assessment plan for details  The following portions of the patient's history were reviewed and updated as appropriate: allergies, current medications, past family history, past medical history, past social history, past surgical history and problem list     Review of Systems   Constitutional: Negative for chills, diaphoresis, fatigue and fever  Respiratory: Negative for apnea, cough, chest tightness, shortness of breath and wheezing  Cardiovascular: Negative for chest pain, palpitations and leg swelling  Gastrointestinal: Negative for abdominal distention, abdominal pain, anal bleeding, constipation, diarrhea, nausea, rectal pain and vomiting  Genitourinary: Negative for difficulty urinating, dyspareunia, dysuria, frequency, hematuria, menstrual problem, pelvic pain, urgency, vaginal bleeding, vaginal discharge and vaginal pain     Musculoskeletal: Negative for arthralgias, back pain and myalgias  Skin: Negative for color change and rash  Neurological: Negative for dizziness, syncope, light-headedness, numbness and headaches  Hematological: Negative for adenopathy  Does not bruise/bleed easily  Psychiatric/Behavioral: Negative for dysphoric mood and sleep disturbance  The patient is not nervous/anxious  Objective:      /82 (BP Location: Left arm, Patient Position: Sitting, Cuff Size: Standard)   Ht 5' 1 75" (1 568 m)   Wt 63 4 kg (139 lb 12 8 oz)   BMI 25 78 kg/m²          Physical Exam    Objective      /82 (BP Location: Left arm, Patient Position: Sitting, Cuff Size: Standard)   Ht 5' 1 75" (1 568 m)   Wt 63 4 kg (139 lb 12 8 oz)   BMI 25 78 kg/m²     General:   alert and oriented, in no acute distress, alert, appears stated age and cooperative   Neck: normal to inspection and palpation   Breast: normal appearance, no masses or tenderness   Heart:    Lungs:    Abdomen: soft, non-tender, without masses or organomegaly   Vulva: normal   Vagina: Mildly atrophic, without erythema or lesions or discharge noted  Grade 1 cystocele noted   Cervix: Mildly atrophic, without lesions or discharge or cervicitis  No CMT     Uterus: top normal size, anteverted, non-tender   Adnexa: no mass, fullness, tenderness   Rectum: negative, grade 1 rectocele

## 2018-10-03 ENCOUNTER — TELEPHONE (OUTPATIENT)
Dept: OBGYN CLINIC | Facility: CLINIC | Age: 58
End: 2018-10-03

## 2018-10-15 ENCOUNTER — HOSPITAL ENCOUNTER (OUTPATIENT)
Dept: MAMMOGRAPHY | Facility: MEDICAL CENTER | Age: 58
Discharge: HOME | End: 2018-10-15
Payer: COMMERCIAL

## 2018-10-15 DIAGNOSIS — Z12.31 VISIT FOR SCREENING MAMMOGRAM: ICD-10-CM

## 2018-10-15 PROCEDURE — 77067 SCR MAMMO BI INCL CAD: CPT

## 2018-11-06 DIAGNOSIS — E78.01 FAMILIAL HYPERCHOLESTEROLEMIA: ICD-10-CM

## 2018-11-06 DIAGNOSIS — I10 ESSENTIAL HYPERTENSION: Primary | ICD-10-CM

## 2018-11-06 RX ORDER — AMLODIPINE BESYLATE AND BENAZEPRIL HYDROCHLORIDE 10; 20 MG/1; MG/1
CAPSULE ORAL
Qty: 90 CAPSULE | Refills: 3 | Status: SHIPPED | OUTPATIENT
Start: 2018-11-06 | End: 2019-10-09 | Stop reason: SDUPTHER

## 2018-11-06 RX ORDER — SIMVASTATIN 20 MG
TABLET ORAL
Qty: 90 TABLET | Refills: 0 | Status: SHIPPED | OUTPATIENT
Start: 2018-11-06 | End: 2019-02-16 | Stop reason: SDUPTHER

## 2019-01-24 DIAGNOSIS — I10 HYPERTENSION, UNSPECIFIED TYPE: Primary | ICD-10-CM

## 2019-01-24 RX ORDER — METOPROLOL SUCCINATE 25 MG/1
25 TABLET, EXTENDED RELEASE ORAL DAILY
Qty: 90 TABLET | Refills: 3 | Status: SHIPPED | OUTPATIENT
Start: 2019-01-24 | End: 2019-10-09 | Stop reason: SDUPTHER

## 2019-02-16 DIAGNOSIS — E78.01 FAMILIAL HYPERCHOLESTEROLEMIA: ICD-10-CM

## 2019-02-17 RX ORDER — SIMVASTATIN 20 MG
TABLET ORAL
Qty: 90 TABLET | Refills: 0 | Status: SHIPPED | OUTPATIENT
Start: 2019-02-17 | End: 2019-05-24 | Stop reason: SDUPTHER

## 2019-05-24 DIAGNOSIS — E78.01 FAMILIAL HYPERCHOLESTEROLEMIA: ICD-10-CM

## 2019-05-24 RX ORDER — SIMVASTATIN 20 MG
TABLET ORAL
Qty: 90 TABLET | Refills: 0 | Status: SHIPPED | OUTPATIENT
Start: 2019-05-24 | End: 2019-08-14 | Stop reason: SDUPTHER

## 2019-08-14 DIAGNOSIS — E78.01 FAMILIAL HYPERCHOLESTEROLEMIA: ICD-10-CM

## 2019-08-14 RX ORDER — SIMVASTATIN 20 MG
TABLET ORAL
Qty: 90 TABLET | Refills: 0 | Status: SHIPPED | OUTPATIENT
Start: 2019-08-14 | End: 2019-10-09 | Stop reason: SDUPTHER

## 2019-09-10 ENCOUNTER — DOCTOR'S OFFICE (OUTPATIENT)
Dept: URBAN - METROPOLITAN AREA CLINIC 136 | Facility: CLINIC | Age: 59
Setting detail: OPHTHALMOLOGY
End: 2019-09-10
Payer: COMMERCIAL

## 2019-09-10 ENCOUNTER — RX ONLY (RX ONLY)
Age: 59
End: 2019-09-10

## 2019-09-10 DIAGNOSIS — H04.123: ICD-10-CM

## 2019-09-10 DIAGNOSIS — H40.013: ICD-10-CM

## 2019-09-10 DIAGNOSIS — H25.013: ICD-10-CM

## 2019-09-10 DIAGNOSIS — H25.13: ICD-10-CM

## 2019-09-10 PROCEDURE — 92004 COMPRE OPH EXAM NEW PT 1/>: CPT | Performed by: OPHTHALMOLOGY

## 2019-09-10 PROCEDURE — 92133 CPTRZD OPH DX IMG PST SGM ON: CPT | Performed by: OPHTHALMOLOGY

## 2019-09-10 PROCEDURE — 76514 ECHO EXAM OF EYE THICKNESS: CPT | Performed by: OPHTHALMOLOGY

## 2019-09-10 ASSESSMENT — SPHEQUIV_DERIVED
OS_SPHEQUIV: -1.25
OD_SPHEQUIV: -1.125

## 2019-09-10 ASSESSMENT — REFRACTION_CURRENTRX
OS_AXIS: 055
OD_OVR_VA: 20/
OD_OVR_VA: 20/
OD_SPHERE: -1.25
OS_OVR_VA: 20/
OD_ADD: +2.50
OD_OVR_VA: 20/
OD_CYLINDER: -1.25
OD_AXIS: 090
OS_ADD: +2.50
OD_VPRISM_DIRECTION: PROGS
OS_CYLINDER: -0.50
OS_SPHERE: -1.50
OS_VPRISM_DIRECTION: PROGS

## 2019-09-10 ASSESSMENT — REFRACTION_MANIFEST
OD_VA3: 20/
OD_VA3: 20/
OS_VA3: 20/
OU_VA: 20/
OS_VA2: 20/
OS_VA1: 20/
OD_VA1: 20/
OD_VA2: 20/
OU_VA: 20/
OS_VA2: 20/
OS_VA1: 20/
OS_VA3: 20/
OD_VA2: 20/
OD_VA1: 20/

## 2019-09-10 ASSESSMENT — PACHYMETRY
OS_CT_CORRECTION: -4
OS_CT_UM: 609
OD_CT_CORRECTION: -4
OD_CT_UM: 601

## 2019-09-10 ASSESSMENT — VISUAL ACUITY
OD_BCVA: 20/25
OS_BCVA: 20/20-1

## 2019-09-10 ASSESSMENT — REFRACTION_AUTOREFRACTION
OD_AXIS: 104
OS_CYLINDER: -0.50
OS_AXIS: 025
OD_CYLINDER: -1.25
OD_SPHERE: -0.50
OS_SPHERE: -1.00

## 2019-09-10 ASSESSMENT — CONFRONTATIONAL VISUAL FIELD TEST (CVF)
OD_FINDINGS: FULL
OS_FINDINGS: FULL

## 2019-09-10 ASSESSMENT — SUPERFICIAL PUNCTATE KERATITIS (SPK): OS_SPK: 2+

## 2019-09-26 ENCOUNTER — OFFICE VISIT (OUTPATIENT)
Dept: FAMILY MEDICINE CLINIC | Facility: CLINIC | Age: 59
End: 2019-09-26
Payer: COMMERCIAL

## 2019-09-26 VITALS
SYSTOLIC BLOOD PRESSURE: 134 MMHG | TEMPERATURE: 98.4 F | HEIGHT: 61 IN | DIASTOLIC BLOOD PRESSURE: 80 MMHG | BODY MASS INDEX: 27.75 KG/M2 | HEART RATE: 76 BPM | WEIGHT: 147 LBS | OXYGEN SATURATION: 96 %

## 2019-09-26 DIAGNOSIS — I10 ESSENTIAL HYPERTENSION: ICD-10-CM

## 2019-09-26 DIAGNOSIS — E78.01 FAMILIAL HYPERCHOLESTEROLEMIA: ICD-10-CM

## 2019-09-26 DIAGNOSIS — Z12.39 SCREENING FOR BREAST CANCER: ICD-10-CM

## 2019-09-26 DIAGNOSIS — I47.1 SVT (SUPRAVENTRICULAR TACHYCARDIA) (HCC): ICD-10-CM

## 2019-09-26 DIAGNOSIS — Z00.00 ENCOUNTER FOR HEALTH MAINTENANCE EXAMINATION: ICD-10-CM

## 2019-09-26 DIAGNOSIS — Z00.00 ANNUAL PHYSICAL EXAM: Primary | ICD-10-CM

## 2019-09-26 PROCEDURE — 99396 PREV VISIT EST AGE 40-64: CPT | Performed by: FAMILY MEDICINE

## 2019-09-26 NOTE — PROGRESS NOTES
[address] [facility]    NAME: Juliann Carias  AGE: 62 y o  SEX: female  : 1960     DATE: 2019     Assessment and Plan:     Problem List Items Addressed This Visit        Cardiovascular and Mediastinum    Hypertension     BP is well controlled on current regimen  SVT (supraventricular tachycardia) ([address] )     She has been completely asymptomatic other than a rare extra beat  She will continue current regimen  She would like to see her 's cardiologist in the future, Dr Martins            Other    Hyperlipidemia     Lipids have been stable on simvastatin  Will check fasting lipid profile  Other Visit Diagnoses     Encounter for health maintenance examination    -  Primary          Immunizations and preventive care screenings were discussed with patient today  Appropriate education was printed on patient's after visit summary  Counseling:  · Dental Health: discussed importance of regular tooth brushing, flossing, and dental visits  BMI Counseling: Body mass index is 27 41 kg/m²  The BMI is above normal  Nutrition recommendations include decreasing portion sizes and encouraging healthy choices of fruits and vegetables  Exercise recommendations include moderate physical activity 150 minutes/week  No pharmacotherapy was ordered  Patient referred to PCP due to patient being overweight  No follow-ups on file  Chief Complaint:     Chief Complaint   Patient presents with    Annual Exam      History of Present Illness:     Adult Annual Physical   Patient here for a comprehensive physical exam  The patient reports problems - joint aches, especially hands and hips  Diet and Physical Activity  · Diet/Nutrition: well balanced diet and consuming 3-5 servings of fruits/vegetables daily  · Exercise: 5-7 times a week on average        Depression Screening  PHQ-9 Depression Screening    PHQ-9: Frequency of the following problems over the past two weeks:            General Health  · Sleep: sleeps well and gets 7-8 hours of sleep on average  · Hearing: normal - bilateral   · Vision: most recent eye exam <1 year ago and wears glasses  · Dental: regular dental visits, brushes teeth twice daily and flosses teeth occasionally  /GYN Health  · Patient is: postmenopausal  · Last menstrual period: 10 yrs ago  · Contraceptive method: none  Review of Systems:     Review of Systems   Constitutional: Negative for activity change, chills, fatigue and fever  HENT: Negative for congestion, ear pain, sinus pressure and sore throat  Eyes: Negative for pain and visual disturbance  Respiratory: Negative for cough, chest tightness, shortness of breath and wheezing  Cardiovascular: Positive for palpitations  Negative for chest pain and leg swelling  Gastrointestinal: Negative for abdominal pain, blood in stool, constipation, diarrhea, nausea and vomiting  Endocrine: Negative for polydipsia and polyuria  Genitourinary: Negative for difficulty urinating, dysuria, frequency and urgency  Musculoskeletal: Positive for arthralgias  Negative for joint swelling and myalgias  Skin: Negative for rash  Neurological: Negative for dizziness, weakness, numbness and headaches  Hematological: Negative for adenopathy  Does not bruise/bleed easily  Psychiatric/Behavioral: Negative for dysphoric mood  The patient is not nervous/anxious         Past Medical History:     Past Medical History:   Diagnosis Date    Recurrent pregnancy loss, antepartum condition or complication     Renal calculi     Uterine leiomyoma     resolved 06/04/2014      Past Surgical History:     Past Surgical History:   Procedure Laterality Date    DIAGNOSTIC LAPAROSCOPY      DILATION AND CURETTAGE OF UTERUS      HYSTEROSCOPY      SALPINGOOPHORECTOMY      hysteroscopy with D&C and laparoscopy with LSO for persistent cyst  Pathology notable for benign endometrium  and benign hemorrhagic cyst of the left ovary     TOOTH EXTRACTION        Social History:     Social History     Socioeconomic History    Marital status: /Civil Union     Spouse name: None    Number of children: 2    Years of education: None    Highest education level: None   Occupational History    None   Social Needs    Financial resource strain: None    Food insecurity:     Worry: None     Inability: None    Transportation needs:     Medical: None     Non-medical: None   Tobacco Use    Smoking status: Former Smoker     Last attempt to quit: 1984     Years since quittin 7    Smokeless tobacco: Never Used   Substance and Sexual Activity    Alcohol use: Yes     Frequency: Monthly or less     Comment: social, maybe 2 drinks/ month    Drug use: No    Sexual activity: None   Lifestyle    Physical activity:     Days per week: None     Minutes per session: None    Stress: None   Relationships    Social connections:     Talks on phone: None     Gets together: None     Attends Christianity service: None     Active member of club or organization: None     Attends meetings of clubs or organizations: None     Relationship status: None    Intimate partner violence:     Fear of current or ex partner: None     Emotionally abused: None     Physically abused: None     Forced sexual activity: None   Other Topics Concern    None   Social History Narrative    Caffeine use    Caodaism affiliation Jainism    Uses safety equipment seatbelts              Family History:     Family History   Problem Relation Age of Onset    Diabetes Mother     Hypertension Mother     Hypertension Father     Hypertension Sister     Hypertension Brother     Breast cancer Maternal Aunt         unspecified laterality    Breast cancer Cousin     Hypertension Family       Current Medications:     Current Outpatient Medications   Medication Sig Dispense Refill    amLODIPine-benazepril (LOTREL) 10-20 MG per capsule TAKE 1 CAPSULE DAILY 90 capsule 3    metoprolol succinate (TOPROL-XL) 25 mg 24 hr tablet Take 1 tablet (25 mg total) by mouth daily 90 tablet 3    Multiple Vitamins-Iron (QC DAILY MULTIVITAMINS/IRON) TABS Take by mouth      simvastatin (ZOCOR) 20 mg tablet TAKE 1 TABLET DAILY 90 tablet 0     No current facility-administered medications for this visit  Allergies:     No Known Allergies   Physical Exam:     /80 (BP Location: Left arm, Patient Position: Sitting, Cuff Size: Standard)   Pulse 76   Temp 98 4 °F (36 9 °C) (Tympanic)   Ht 5' 1 4" (1 56 m)   Wt 66 7 kg (147 lb)   SpO2 96%   BMI 27 41 kg/m²     Physical Exam   Constitutional: She is oriented to person, place, and time  She appears well-developed and well-nourished  No distress  HENT:   Head: Normocephalic and atraumatic  Right Ear: External ear normal    Left Ear: External ear normal    Nose: Nose normal    Mouth/Throat: Oropharynx is clear and moist    Eyes: Pupils are equal, round, and reactive to light  Conjunctivae and EOM are normal  No scleral icterus  Neck: Normal range of motion  Neck supple  No thyromegaly present  Cardiovascular: Normal rate, regular rhythm, normal heart sounds and intact distal pulses  No murmur heard  Pulmonary/Chest: Effort normal and breath sounds normal  She has no wheezes  She has no rales  Abdominal: Soft  Bowel sounds are normal  She exhibits no mass  There is no tenderness  Musculoskeletal: She exhibits no tenderness or deformity  Lymphadenopathy:     She has no cervical adenopathy  Neurological: She is alert and oriented to person, place, and time  She has normal reflexes  No cranial nerve deficit  Skin: Skin is warm and dry  No rash noted  No erythema  Psychiatric: She has a normal mood and affect  Her behavior is normal    Nursing note and vitals reviewed        MD Vira Smith [id number]

## 2019-09-26 NOTE — PATIENT INSTRUCTIONS

## 2019-09-26 NOTE — ASSESSMENT & PLAN NOTE
She has been completely asymptomatic other than a rare extra beat  She will continue current regimen    She would like to see her 's cardiologist in the future, Sharren Dakin

## 2019-10-03 LAB
ALBUMIN SERPL-MCNC: 4.3 G/DL (ref 3.6–5.1)
ALBUMIN/GLOB SERPL: 1.7 (CALC) (ref 1–2.5)
ALP SERPL-CCNC: 72 U/L (ref 33–130)
ALT SERPL-CCNC: 20 U/L (ref 6–29)
AST SERPL-CCNC: 20 U/L (ref 10–35)
BILIRUB SERPL-MCNC: 0.5 MG/DL (ref 0.2–1.2)
BUN SERPL-MCNC: 13 MG/DL (ref 7–25)
BUN/CREAT SERPL: NORMAL (CALC) (ref 6–22)
CALCIUM SERPL-MCNC: 9.3 MG/DL (ref 8.6–10.4)
CHLORIDE SERPL-SCNC: 101 MMOL/L (ref 98–110)
CHOLEST SERPL-MCNC: 200 MG/DL
CHOLEST/HDLC SERPL: 4.2 (CALC)
CO2 SERPL-SCNC: 26 MMOL/L (ref 20–32)
CREAT SERPL-MCNC: 0.92 MG/DL (ref 0.5–0.99)
GLOBULIN SER CALC-MCNC: 2.6 G/DL (CALC) (ref 1.9–3.7)
GLUCOSE SERPL-MCNC: 87 MG/DL (ref 65–99)
HDLC SERPL-MCNC: 48 MG/DL
LDLC SERPL CALC-MCNC: 116 MG/DL (CALC)
NONHDLC SERPL-MCNC: 152 MG/DL (CALC)
POTASSIUM SERPL-SCNC: 4.1 MMOL/L (ref 3.5–5.3)
PROT SERPL-MCNC: 6.9 G/DL (ref 6.1–8.1)
SL AMB EGFR AFRICAN AMERICAN: 78 ML/MIN/1.73M2
SL AMB EGFR NON AFRICAN AMERICAN: 67 ML/MIN/1.73M2
SODIUM SERPL-SCNC: 137 MMOL/L (ref 135–146)
TRIGL SERPL-MCNC: 240 MG/DL

## 2019-10-09 DIAGNOSIS — I10 HYPERTENSION, UNSPECIFIED TYPE: ICD-10-CM

## 2019-10-09 DIAGNOSIS — E78.01 FAMILIAL HYPERCHOLESTEROLEMIA: ICD-10-CM

## 2019-10-09 DIAGNOSIS — I10 ESSENTIAL HYPERTENSION: ICD-10-CM

## 2019-10-09 RX ORDER — SIMVASTATIN 20 MG
20 TABLET ORAL DAILY
Qty: 90 TABLET | Refills: 3 | Status: SHIPPED | OUTPATIENT
Start: 2019-10-09 | End: 2019-10-18 | Stop reason: SDUPTHER

## 2019-10-09 RX ORDER — AMLODIPINE BESYLATE AND BENAZEPRIL HYDROCHLORIDE 10; 20 MG/1; MG/1
1 CAPSULE ORAL DAILY
Qty: 90 CAPSULE | Refills: 3 | Status: SHIPPED | OUTPATIENT
Start: 2019-10-09 | End: 2019-10-18 | Stop reason: SDUPTHER

## 2019-10-09 RX ORDER — METOPROLOL SUCCINATE 25 MG/1
25 TABLET, EXTENDED RELEASE ORAL DAILY
Qty: 90 TABLET | Refills: 3 | Status: SHIPPED | OUTPATIENT
Start: 2019-10-09 | End: 2019-10-18 | Stop reason: SDUPTHER

## 2019-10-18 DIAGNOSIS — E78.01 FAMILIAL HYPERCHOLESTEROLEMIA: ICD-10-CM

## 2019-10-18 DIAGNOSIS — I10 ESSENTIAL HYPERTENSION: ICD-10-CM

## 2019-10-18 DIAGNOSIS — I10 HYPERTENSION, UNSPECIFIED TYPE: ICD-10-CM

## 2019-10-18 RX ORDER — SIMVASTATIN 20 MG
20 TABLET ORAL DAILY
Qty: 90 TABLET | Refills: 3 | Status: SHIPPED | OUTPATIENT
Start: 2019-10-18 | End: 2020-09-29 | Stop reason: SDUPTHER

## 2019-10-18 RX ORDER — METOPROLOL SUCCINATE 25 MG/1
25 TABLET, EXTENDED RELEASE ORAL DAILY
Qty: 90 TABLET | Refills: 3 | Status: SHIPPED | OUTPATIENT
Start: 2019-10-18 | End: 2020-09-29 | Stop reason: SDUPTHER

## 2019-10-18 RX ORDER — AMLODIPINE BESYLATE AND BENAZEPRIL HYDROCHLORIDE 10; 20 MG/1; MG/1
1 CAPSULE ORAL DAILY
Qty: 90 CAPSULE | Refills: 3 | Status: SHIPPED | OUTPATIENT
Start: 2019-10-18 | End: 2020-09-29 | Stop reason: SDUPTHER

## 2020-02-06 ENCOUNTER — HOSPITAL ENCOUNTER (OUTPATIENT)
Dept: MAMMOGRAPHY | Facility: MEDICAL CENTER | Age: 60
Discharge: HOME | End: 2020-02-06
Attending: FAMILY MEDICINE
Payer: COMMERCIAL

## 2020-02-06 VITALS — HEIGHT: 61 IN | WEIGHT: 140 LBS | BODY MASS INDEX: 26.43 KG/M2

## 2020-02-06 DIAGNOSIS — Z12.31 ENCOUNTER FOR SCREENING MAMMOGRAM FOR MALIGNANT NEOPLASM OF BREAST: ICD-10-CM

## 2020-02-06 DIAGNOSIS — Z12.39 SCREENING FOR BREAST CANCER: ICD-10-CM

## 2020-02-06 PROCEDURE — 77067 SCR MAMMO BI INCL CAD: CPT

## 2020-02-06 PROCEDURE — 77063 BREAST TOMOSYNTHESIS BI: CPT

## 2020-06-10 ENCOUNTER — OFFICE VISIT (OUTPATIENT)
Dept: FAMILY MEDICINE CLINIC | Facility: CLINIC | Age: 60
End: 2020-06-10
Payer: COMMERCIAL

## 2020-06-10 VITALS
WEIGHT: 139 LBS | HEIGHT: 61 IN | TEMPERATURE: 99 F | BODY MASS INDEX: 26.24 KG/M2 | RESPIRATION RATE: 18 BRPM | HEART RATE: 117 BPM | SYSTOLIC BLOOD PRESSURE: 148 MMHG | DIASTOLIC BLOOD PRESSURE: 74 MMHG | OXYGEN SATURATION: 98 %

## 2020-06-10 DIAGNOSIS — I47.1 SVT (SUPRAVENTRICULAR TACHYCARDIA) (HCC): ICD-10-CM

## 2020-06-10 DIAGNOSIS — Z11.4 SCREENING FOR HIV WITHOUT PRESENCE OF RISK FACTORS: ICD-10-CM

## 2020-06-10 DIAGNOSIS — F41.9 ANXIETY: ICD-10-CM

## 2020-06-10 DIAGNOSIS — I10 ESSENTIAL HYPERTENSION: Primary | ICD-10-CM

## 2020-06-10 DIAGNOSIS — R53.83 FATIGUE, UNSPECIFIED TYPE: ICD-10-CM

## 2020-06-10 PROCEDURE — 3077F SYST BP >= 140 MM HG: CPT | Performed by: FAMILY MEDICINE

## 2020-06-10 PROCEDURE — 1036F TOBACCO NON-USER: CPT | Performed by: FAMILY MEDICINE

## 2020-06-10 PROCEDURE — 99214 OFFICE O/P EST MOD 30 MIN: CPT | Performed by: FAMILY MEDICINE

## 2020-06-10 PROCEDURE — 3008F BODY MASS INDEX DOCD: CPT | Performed by: FAMILY MEDICINE

## 2020-06-10 PROCEDURE — 3078F DIAST BP <80 MM HG: CPT | Performed by: FAMILY MEDICINE

## 2020-06-10 RX ORDER — HYDROXYZINE HYDROCHLORIDE 10 MG/1
TABLET, FILM COATED ORAL
Qty: 30 TABLET | Refills: 0 | Status: SHIPPED | OUTPATIENT
Start: 2020-06-10 | End: 2020-07-09

## 2020-06-12 LAB
BASOPHILS # BLD AUTO: 38 CELLS/UL (ref 0–200)
BASOPHILS NFR BLD AUTO: 0.8 %
EOSINOPHIL # BLD AUTO: 132 CELLS/UL (ref 15–500)
EOSINOPHIL NFR BLD AUTO: 2.8 %
ERYTHROCYTE [DISTWIDTH] IN BLOOD BY AUTOMATED COUNT: 12.5 % (ref 11–15)
HCT VFR BLD AUTO: 42.7 % (ref 35–45)
HGB BLD-MCNC: 14 G/DL (ref 11.7–15.5)
HIV 1+2 AB+HIV1 P24 AG SERPL QL IA: NORMAL
LYMPHOCYTES # BLD AUTO: 1725 CELLS/UL (ref 850–3900)
LYMPHOCYTES NFR BLD AUTO: 36.7 %
MCH RBC QN AUTO: 30 PG (ref 27–33)
MCHC RBC AUTO-ENTMCNC: 32.8 G/DL (ref 32–36)
MCV RBC AUTO: 91.6 FL (ref 80–100)
MONOCYTES # BLD AUTO: 498 CELLS/UL (ref 200–950)
MONOCYTES NFR BLD AUTO: 10.6 %
NEUTROPHILS # BLD AUTO: 2308 CELLS/UL (ref 1500–7800)
NEUTROPHILS NFR BLD AUTO: 49.1 %
PLATELET # BLD AUTO: 314 THOUSAND/UL (ref 140–400)
PMV BLD REES-ECKER: 10.4 FL (ref 7.5–12.5)
RBC # BLD AUTO: 4.66 MILLION/UL (ref 3.8–5.1)
TSH SERPL-ACNC: 1.37 MIU/L (ref 0.4–4.5)
WBC # BLD AUTO: 4.7 THOUSAND/UL (ref 3.8–10.8)

## 2020-07-08 DIAGNOSIS — F41.9 ANXIETY: ICD-10-CM

## 2020-07-09 RX ORDER — HYDROXYZINE HYDROCHLORIDE 10 MG/1
TABLET, FILM COATED ORAL
Qty: 30 TABLET | Refills: 0 | Status: SHIPPED | OUTPATIENT
Start: 2020-07-09 | End: 2020-08-05 | Stop reason: SDUPTHER

## 2020-08-04 ENCOUNTER — PATIENT MESSAGE (OUTPATIENT)
Dept: FAMILY MEDICINE CLINIC | Facility: CLINIC | Age: 60
End: 2020-08-04

## 2020-08-04 DIAGNOSIS — F41.9 ANXIETY: ICD-10-CM

## 2020-08-05 RX ORDER — HYDROXYZINE HYDROCHLORIDE 10 MG/1
TABLET, FILM COATED ORAL
Qty: 60 TABLET | Refills: 0 | Status: SHIPPED | OUTPATIENT
Start: 2020-08-05 | End: 2021-01-29 | Stop reason: SDUPTHER

## 2020-08-05 NOTE — TELEPHONE ENCOUNTER
From: Myranda Solorzano  To: Zeina Ibarra MD  Sent: 8/4/2020 9:22 AM EDT  Subject: Prescription Question    On my last visit to you, I was prescribed hydroxyzine  I have been taking them when feeling anxious but not on a regular basis  On Saturday night, I woke up in the middle of the night with what I am assuming was an anxiety attack  I had chills, rapid heart rate, elevated blood pressure  The same thing happened to me last night at 1 am  with the same side effects along with a tingling in my chest and extremities  When I went to bed, my blood pressure was 114/70 with a pulse of 76 and when this happened it was 127/84 with a pulse of 86  I realize those are still fine numbers but I was concerned about the whole tingling thing, then it makes it worse  I did take a hydroxyzine last night before bed and again when this happened at 1:00 am  but was still up for 3 hours afterwards  Can you please advise as to whether this is normal, should I be taking the hydroxyzine more often    recommending something else?     Thank you  Myranda Solorzano

## 2020-08-26 ENCOUNTER — OFFICE VISIT (OUTPATIENT)
Dept: FAMILY MEDICINE CLINIC | Facility: CLINIC | Age: 60
End: 2020-08-26
Payer: COMMERCIAL

## 2020-08-26 VITALS
SYSTOLIC BLOOD PRESSURE: 124 MMHG | HEART RATE: 98 BPM | RESPIRATION RATE: 18 BRPM | TEMPERATURE: 98.4 F | DIASTOLIC BLOOD PRESSURE: 78 MMHG | BODY MASS INDEX: 25.83 KG/M2 | OXYGEN SATURATION: 98 % | WEIGHT: 136.8 LBS | HEIGHT: 61 IN

## 2020-08-26 DIAGNOSIS — E55.9 VITAMIN D DEFICIENCY: ICD-10-CM

## 2020-08-26 DIAGNOSIS — I47.1 SVT (SUPRAVENTRICULAR TACHYCARDIA) (HCC): ICD-10-CM

## 2020-08-26 DIAGNOSIS — I10 ESSENTIAL HYPERTENSION: ICD-10-CM

## 2020-08-26 DIAGNOSIS — F41.9 ANXIETY: Primary | ICD-10-CM

## 2020-08-26 PROCEDURE — 3074F SYST BP LT 130 MM HG: CPT | Performed by: FAMILY MEDICINE

## 2020-08-26 PROCEDURE — 3008F BODY MASS INDEX DOCD: CPT | Performed by: FAMILY MEDICINE

## 2020-08-26 PROCEDURE — 3078F DIAST BP <80 MM HG: CPT | Performed by: FAMILY MEDICINE

## 2020-08-26 PROCEDURE — 99214 OFFICE O/P EST MOD 30 MIN: CPT | Performed by: FAMILY MEDICINE

## 2020-08-26 PROCEDURE — 1036F TOBACCO NON-USER: CPT | Performed by: FAMILY MEDICINE

## 2020-08-26 NOTE — ASSESSMENT & PLAN NOTE
She denies any sx recently    She will switch to a new cardiologist and she is scheduled this week to see Dr Hilda Dyer with [facility]

## 2020-08-26 NOTE — PROGRESS NOTES
Assessment/Plan:    Anxiety  Would continue hydroxyzine prn    Hypertension  Blood pressure has been stable on Lotrel 10-20  She will continue to monitor  SVT (supraventricular tachycardia) ([address] )  She denies any sx recently  She will switch to a new cardiologist and she is scheduled this week to see Dr Hugh Delgado with [facility]       Diagnoses and all orders for this visit:    Anxiety    Vitamin D deficiency  -     Vitamin D 25 hydroxy; Future    Essential hypertension    SVT (supraventricular tachycardia) (HCC)          Subjective:      Patient ID: Twila Valentino is a 56 y o  female  She has had several episodes of anxiety sx but then she realized she had similar sx a few years ago when we discovered she had B vit deficiency  She had been sending My Chart messages about her sx  Hydroxyzine helped for racing heart and elevated BP  She had 3 episodes in the middle of the night  Hydroxyzine eradicated these sx    Three days ago she had an unusual episode about 10 a m  Lulu Chestnut She felt a cold sensation through the torso and a light tingling sensation in the arms and legs  BP was OK  Hydroxyzine was not helping  This sensation was intermittent throughout Sunday and Monday  She was not undergoing any major stresses  It was hot weather but she was staying inside in the air conditioning  She has not been sick in any other way  She realize that this might not be anxiety symptoms so she took 5000 units of vitamin-D on Sunday night and then again on Monday night  She has been feeling fine ever since  The following portions of the patient's history were reviewed and updated as appropriate: allergies, current medications, past family history, past medical history, past social history, past surgical history and problem list     Review of Systems   Constitutional: Negative for activity change, chills, fatigue and fever  Respiratory: Negative for shortness of breath      Gastrointestinal: Negative for abdominal pain    Neurological: Positive for numbness  Objective:      /78 (BP Location: Left arm, Patient Position: Sitting, Cuff Size: Large)   Pulse 98   Temp 98 4 °F (36 9 °C) (Temporal)   Resp 18   Ht 5' 1" (1 549 m)   Wt 62 1 kg (136 lb 12 8 oz)   SpO2 98%   BMI 25 85 kg/m²          Physical Exam  Vitals signs and nursing note reviewed  Constitutional:       Appearance: Normal appearance  Cardiovascular:      Rate and Rhythm: Normal rate and regular rhythm  Pulses: Normal pulses  Heart sounds: Normal heart sounds  Neurological:      General: No focal deficit present  Mental Status: She is alert and oriented to person, place, and time     Psychiatric:         Mood and Affect: Mood normal          Behavior: Behavior normal

## 2020-08-31 ENCOUNTER — ANNUAL EXAM (OUTPATIENT)
Dept: OBGYN CLINIC | Facility: CLINIC | Age: 60
End: 2020-08-31
Payer: COMMERCIAL

## 2020-08-31 VITALS
WEIGHT: 138.4 LBS | BODY MASS INDEX: 26.13 KG/M2 | HEIGHT: 61 IN | TEMPERATURE: 98 F | DIASTOLIC BLOOD PRESSURE: 82 MMHG | SYSTOLIC BLOOD PRESSURE: 140 MMHG

## 2020-08-31 DIAGNOSIS — Z01.419 WOMEN'S ANNUAL ROUTINE GYNECOLOGICAL EXAMINATION: ICD-10-CM

## 2020-08-31 DIAGNOSIS — N95.2 ATROPHY OF VAGINA: ICD-10-CM

## 2020-08-31 DIAGNOSIS — Z11.51 SCREENING FOR HPV (HUMAN PAPILLOMAVIRUS): ICD-10-CM

## 2020-08-31 DIAGNOSIS — N81.11 MIDLINE CYSTOCELE: ICD-10-CM

## 2020-08-31 DIAGNOSIS — Z12.31 ENCOUNTER FOR SCREENING MAMMOGRAM FOR MALIGNANT NEOPLASM OF BREAST: ICD-10-CM

## 2020-08-31 DIAGNOSIS — Z12.4 SCREENING FOR CERVICAL CANCER: Primary | ICD-10-CM

## 2020-08-31 PROBLEM — I47.1 SVT (SUPRAVENTRICULAR TACHYCARDIA) (HCC): Status: ACTIVE | Noted: 2020-08-28

## 2020-08-31 PROBLEM — I47.10 SVT (SUPRAVENTRICULAR TACHYCARDIA): Status: ACTIVE | Noted: 2020-08-28

## 2020-08-31 PROBLEM — R07.2 PRECORDIAL PAIN: Status: ACTIVE | Noted: 2020-08-28

## 2020-08-31 PROCEDURE — G0145 SCR C/V CYTO,THINLAYER,RESCR: HCPCS | Performed by: OBSTETRICS & GYNECOLOGY

## 2020-08-31 PROCEDURE — 3008F BODY MASS INDEX DOCD: CPT | Performed by: FAMILY MEDICINE

## 2020-08-31 PROCEDURE — 99396 PREV VISIT EST AGE 40-64: CPT | Performed by: OBSTETRICS & GYNECOLOGY

## 2020-08-31 PROCEDURE — 87624 HPV HI-RISK TYP POOLED RSLT: CPT | Performed by: OBSTETRICS & GYNECOLOGY

## 2020-08-31 NOTE — PROGRESS NOTES
Assessment/Plan   Diagnoses and all orders for this visit:    Screening for cervical cancer  -     Liquid-based pap, screening    Encounter for screening mammogram for malignant neoplasm of breast  -     Mammo screening bilateral w 3d & cad; Future    Atrophy of vagina    Midline cystocele    Women's annual routine gynecological examination    Screening for HPV (human papillomavirus)  -     Liquid-based pap, screening    1  Yearly exam-Pap smear done with HPV testing, self-breast awareness reviewed, calcium/vitamin-D recommendations discussed, mammogram request given, colonoscopy as per specialist   2  Uterovaginal prolapse-minimal changes noted on exam with grade 1 rectocele/cystocele  Patient without any urinary/bowel movement issues  Would recommend no intervention other than Kegel's exercises  3  Mild vaginal atrophy-noted on exam   Patient is not sexually active due to muscle issues with her  preventing intercourse  Vaginal lubrication/moisturizer sheet was given  4  History uterine myoma-patient did note some twinge a few months back toward the right by her recollection  Exam was notable for uterus which was 6 week size with possible fullness toward the right  No tenderness was noted  Given this history, she will return in the near future for ultrasound and office visit with me  5  History urinary incontinence-no current issues  6  Charlotte Hilton is now 6 weeks pregnant with her 2nd child  My congratulations were given  She has appointment later today  Follow-up near future for ultrasound and office visit with me  Subjective   Patient ID: Shira Canales is a 56 y o  female  Vitals:    08/31/20 0719   BP: 140/82   Temp: 98 °F (36 7 °C)     Patient was seen today for yearly exam   Please see assessment plan for details        The following portions of the patient's history were reviewed and updated as appropriate: allergies, current medications, past family history, past medical history, past social history, past surgical history and problem list   Past Medical History:   Diagnosis Date    Recurrent pregnancy loss, antepartum condition or complication     Renal calculi     Uterine leiomyoma     resolved 2014     Past Surgical History:   Procedure Laterality Date    BREAST BIOPSY Left     DIAGNOSTIC LAPAROSCOPY      DILATION AND CURETTAGE OF UTERUS      HYSTEROSCOPY      SALPINGOOPHORECTOMY Left     hysteroscopy with D&C and laparoscopy with LSO for persistent cyst  Pathology notable for benign endometrium  and benign hemorrhagic cyst of the left ovary     TOOTH EXTRACTION       OB History    Para Term  AB Living   3 2 2   1 2   SAB TAB Ectopic Multiple Live Births   1              # Outcome Date GA Lbr [place] Anthony/2nd Weight Sex Delivery Anes PTL Lv   3 Term            2 Term            1 SAB                Current Outpatient Medications:     amLODIPine-benazepril (LOTREL) 10-20 MG per capsule, Take 1 capsule by mouth daily, Disp: 90 capsule, Rfl: 3    hydrOXYzine HCL (ATARAX) 10 mg tablet, TAKE 2-3 TABLETS BY MOUTH EVERY 6 HOURS AS NEEDED FOR ANXIETY, Disp: 60 tablet, Rfl: 0    metoprolol succinate (TOPROL-XL) 25 mg 24 hr tablet, Take 1 tablet (25 mg total) by mouth daily, Disp: 90 tablet, Rfl: 3    Multiple Vitamins-Iron (QC DAILY MULTIVITAMINS/IRON) TABS, Take by mouth, Disp: , Rfl:     simvastatin (ZOCOR) 20 mg tablet, Take 1 tablet (20 mg total) by mouth daily, Disp: 90 tablet, Rfl: 3  No Known Allergies  Social History     Socioeconomic History    Marital status: /Civil Union     Spouse name: None    Number of children: 2    Years of education: None    Highest education level: None   Occupational History    None   Social Needs    Financial resource strain: None    Food insecurity     Worry: None     Inability: None    Transportation needs     Medical: None     Non-medical: None   Tobacco Use    Smoking status: Former Smoker     Last attempt to quit: 1984     Years since quittin 6    Smokeless tobacco: Never Used   Substance and Sexual Activity    Alcohol use: Yes     Frequency: Monthly or less     Comment: social, maybe 2 drinks/ month    Drug use: No    Sexual activity: None   Lifestyle    Physical activity     Days per week: None     Minutes per session: None    Stress: None   Relationships    Social connections     Talks on phone: None     Gets together: None     Attends Roman Catholic service: None     Active member of club or organization: None     Attends meetings of clubs or organizations: None     Relationship status: None    Intimate partner violence     Fear of current or ex partner: None     Emotionally abused: None     Physically abused: None     Forced sexual activity: None   Other Topics Concern    None   Social History Narrative    Caffeine use    Shinto affiliation Latter day    Uses safety equipment seatbelts             Family History   Problem Relation Age of Onset    Diabetes Mother     Hypertension Mother     Hypertension Father     Hypertension Sister     Hypertension Brother     Breast cancer Maternal Aunt 80        unspecified laterality    Breast cancer Cousin 61    Hypertension Family     No Known Problems Daughter     No Known Problems Maternal Grandmother     No Known Problems Maternal Grandfather     No Known Problems Paternal Grandmother     No Known Problems Paternal Grandfather     Hypertension Sister     No Known Problems Paternal Aunt        Review of Systems   Constitutional: Negative for chills, diaphoresis, fatigue and fever  Respiratory: Negative for apnea, cough, chest tightness, shortness of breath and wheezing  Cardiovascular: Negative for chest pain, palpitations and leg swelling  Gastrointestinal: Negative for abdominal distention, abdominal pain, anal bleeding, constipation, diarrhea, nausea, rectal pain and vomiting     Genitourinary: Negative for difficulty urinating, dyspareunia, dysuria, frequency, hematuria, menstrual problem, pelvic pain, urgency, vaginal bleeding, vaginal discharge and vaginal pain  Musculoskeletal: Negative for arthralgias, back pain and myalgias  Skin: Negative for color change and rash  Neurological: Negative for dizziness, syncope, light-headedness, numbness and headaches  Hematological: Negative for adenopathy  Does not bruise/bleed easily  Psychiatric/Behavioral: Negative for dysphoric mood and sleep disturbance  The patient is not nervous/anxious  Objective   Physical Exam    Objective      /82 (BP Location: Left arm, Patient Position: Sitting, Cuff Size: Large)   Temp 98 °F (36 7 °C)   Ht 5' 1" (1 549 m)   Wt 62 8 kg (138 lb 6 4 oz)   BMI 26 15 kg/m²     General:   alert and oriented, in no acute distress   Neck: normal to inspection and palpation   Breast: normal appearance, no masses or tenderness   Heart:    Lungs:    Abdomen: soft, non-tender, without masses or organomegaly   Vulva: normal   Vagina: Mildly atrophic, without erythema or lesions or discharge  Very small grade 1 cystocele noted   Cervix: Mildly atrophic, without lesions or discharge or cervicitis  No Cervical motion tenderness  Overall good support noted to the cervix  Uterus: anteverted, non-tender, size consistent with Six weeks   Adnexa: Possible slight fullness toward the right, nontender  Left was negative  Rectum: Confirmatory, with possible slight fullness toward the right adnexa without tenderness noted  Left was negative      Psych:  Normal mood and affect   Skin:  Without obvious lesions   Eyes: symmetric, with normal movements and reactivity   Musculoskeletal:  Normal muscle tone and movements appreciated

## 2020-09-02 LAB
HPV HR 12 DNA CVX QL NAA+PROBE: NEGATIVE
HPV16 DNA CVX QL NAA+PROBE: NEGATIVE
HPV18 DNA CVX QL NAA+PROBE: NEGATIVE

## 2020-09-08 LAB
LAB AP GYN PRIMARY INTERPRETATION: NORMAL
Lab: NORMAL

## 2020-09-29 ENCOUNTER — DOCTOR'S OFFICE (OUTPATIENT)
Dept: URBAN - METROPOLITAN AREA CLINIC 136 | Facility: CLINIC | Age: 60
Setting detail: OPHTHALMOLOGY
End: 2020-09-29
Payer: COMMERCIAL

## 2020-09-29 ENCOUNTER — OFFICE VISIT (OUTPATIENT)
Dept: FAMILY MEDICINE CLINIC | Facility: CLINIC | Age: 60
End: 2020-09-29
Payer: COMMERCIAL

## 2020-09-29 VITALS
SYSTOLIC BLOOD PRESSURE: 116 MMHG | OXYGEN SATURATION: 97 % | DIASTOLIC BLOOD PRESSURE: 70 MMHG | TEMPERATURE: 97.5 F | HEIGHT: 61 IN | WEIGHT: 137 LBS | BODY MASS INDEX: 25.86 KG/M2 | HEART RATE: 82 BPM

## 2020-09-29 DIAGNOSIS — H04.123: ICD-10-CM

## 2020-09-29 DIAGNOSIS — H25.013: ICD-10-CM

## 2020-09-29 DIAGNOSIS — E78.01 FAMILIAL HYPERCHOLESTEROLEMIA: ICD-10-CM

## 2020-09-29 DIAGNOSIS — Z00.00 ANNUAL PHYSICAL EXAM: Primary | ICD-10-CM

## 2020-09-29 DIAGNOSIS — I47.1 SVT (SUPRAVENTRICULAR TACHYCARDIA) (HCC): ICD-10-CM

## 2020-09-29 DIAGNOSIS — R73.9 HYPERGLYCEMIA: ICD-10-CM

## 2020-09-29 DIAGNOSIS — H43.812: ICD-10-CM

## 2020-09-29 DIAGNOSIS — H18.013: ICD-10-CM

## 2020-09-29 DIAGNOSIS — I10 HYPERTENSION, UNSPECIFIED TYPE: ICD-10-CM

## 2020-09-29 DIAGNOSIS — I10 ESSENTIAL HYPERTENSION: ICD-10-CM

## 2020-09-29 PROBLEM — I47.10 SVT (SUPRAVENTRICULAR TACHYCARDIA): Status: RESOLVED | Noted: 2017-10-30 | Resolved: 2020-09-29

## 2020-09-29 PROBLEM — H40.013 GLAUCOMA SUSPECT OPEN ANGLE; BOTH EYES: Status: RESOLVED | Noted: 2019-09-10 | Resolved: 2020-09-29

## 2020-09-29 PROBLEM — H04.121 DRY EYE; RIGHT EYE, LEFT EYE: Status: ACTIVE | Noted: 2020-09-29

## 2020-09-29 PROBLEM — H04.122 DRY EYE; RIGHT EYE, LEFT EYE: Status: ACTIVE | Noted: 2020-09-29

## 2020-09-29 PROCEDURE — 99396 PREV VISIT EST AGE 40-64: CPT | Performed by: FAMILY MEDICINE

## 2020-09-29 PROCEDURE — 92014 COMPRE OPH EXAM EST PT 1/>: CPT | Performed by: OPHTHALMOLOGY

## 2020-09-29 PROCEDURE — 3725F SCREEN DEPRESSION PERFORMED: CPT | Performed by: FAMILY MEDICINE

## 2020-09-29 RX ORDER — MELATONIN 5 MG
TABLET,CHEWABLE ORAL
COMMUNITY
Start: 2020-09-21 | End: 2021-03-18 | Stop reason: ALTCHOICE

## 2020-09-29 RX ORDER — SIMVASTATIN 20 MG
20 TABLET ORAL DAILY
Qty: 90 TABLET | Refills: 3 | Status: SHIPPED | OUTPATIENT
Start: 2020-09-29

## 2020-09-29 RX ORDER — AMLODIPINE BESYLATE AND BENAZEPRIL HYDROCHLORIDE 10; 20 MG/1; MG/1
1 CAPSULE ORAL DAILY
Qty: 90 CAPSULE | Refills: 3 | Status: SHIPPED | OUTPATIENT
Start: 2020-09-29 | End: 2022-06-02 | Stop reason: SDUPTHER

## 2020-09-29 RX ORDER — METOPROLOL SUCCINATE 25 MG/1
25 TABLET, EXTENDED RELEASE ORAL DAILY
Qty: 90 TABLET | Refills: 3 | Status: SHIPPED | OUTPATIENT
Start: 2020-09-29

## 2020-09-29 ASSESSMENT — REFRACTION_CURRENTRX
OD_VPRISM_DIRECTION: PROGS
OD_SPHERE: -1.25
OS_OVR_VA: 20/
OS_SPHERE: -1.50
OD_ADD: +2.50
OS_ADD: +2.50
OD_OVR_VA: 20/
OS_AXIS: 055
OS_CYLINDER: -0.50
OD_AXIS: 090
OD_CYLINDER: -1.25
OS_VPRISM_DIRECTION: PROGS

## 2020-09-29 ASSESSMENT — SPHEQUIV_DERIVED
OS_SPHEQUIV: -0.75
OD_SPHEQUIV: -1.375

## 2020-09-29 ASSESSMENT — REFRACTION_AUTOREFRACTION
OD_AXIS: 097
OD_SPHERE: -0.50
OS_SPHERE: -0.50
OS_CYLINDER: -0.50
OD_CYLINDER: -1.75
OS_AXIS: 074

## 2020-09-29 ASSESSMENT — CONFRONTATIONAL VISUAL FIELD TEST (CVF)
OD_FINDINGS: FULL
OS_FINDINGS: FULL

## 2020-09-29 ASSESSMENT — VISUAL ACUITY
OD_BCVA: 20/25
OS_BCVA: 20/20-2

## 2020-09-29 ASSESSMENT — SUPERFICIAL PUNCTATE KERATITIS (SPK)
OS_SPK: T
OD_SPK: T

## 2020-09-29 NOTE — PROGRESS NOTES
[address] [facility]    NAME: Trey Delcid  AGE: 56 y o  SEX: female  : 1960     DATE: 2020     Assessment and Plan:     Problem List Items Addressed This Visit        Cardiovascular and Mediastinum    Hypertension       Blood pressure is under excellent control  She will continue the Lotrel and metoprolol  Relevant Medications    amLODIPine-benazepril (LOTREL) 10-20 MG per capsule    metoprolol succinate (TOPROL-XL) 25 mg 24 hr tablet    SVT (supraventricular tachycardia) (HCC)     No recent recurrence  Normal stress echo  She will cont yearly cardiology f/u         Relevant Medications    metoprolol succinate (TOPROL-XL) 25 mg 24 hr tablet       Other    Hyperlipidemia     She has been stable on simvastatin 20 milligrams daily  We will recheck fasting lipid profile  Relevant Medications    simvastatin (ZOCOR) 20 mg tablet    Other Relevant Orders    Lipid panel    Comprehensive metabolic panel      Other Visit Diagnoses     Annual physical exam    -  Primary    Hyperglycemia        Relevant Orders    HEMOGLOBIN A1C W/ EAG ESTIMATION          Immunizations and preventive care screenings were discussed with patient today  Appropriate education was printed on patient's after visit summary  Counseling:  ·     BMI Counseling: Body mass index is 25 89 kg/m²  The BMI is above normal  Nutrition recommendations include encouraging healthy choices of fruits and vegetables  Exercise recommendations include moderate physical activity 150 minutes/week  No pharmacotherapy was ordered  No follow-ups on file  Chief Complaint:     Chief Complaint   Patient presents with    Physical Exam      History of Present Illness:     Adult Annual Physical   Patient here for a comprehensive physical exam  The patient reports no problems      Diet and Physical Activity  · Diet/Nutrition: well balanced diet, limited junk food and consuming 3-5 servings of fruits/vegetables daily  · Exercise: moderate cardiovascular exercise and 3-4 times a week on average  Depression Screening  PHQ-9 Depression Screening    PHQ-9:    Frequency of the following problems over the past two weeks:       Little interest or pleasure in doing things:  0 - not at all  Feeling down, depressed, or hopeless:  0 - not at all  PHQ-2 Score:  0       General Health  · Sleep: sleeps well and gets 7-8 hours of sleep on average  · Hearing: normal - bilateral   · Vision: no vision problems, most recent eye exam <1 year ago and wears glasses  · Dental: regular dental visits, brushes teeth twice daily and flosses teeth occasionally  /GYN Health  · Patient is: postmenopausal  · Last menstrual period: age 46  · Contraceptive method: none  Review of Systems:     Review of Systems   Constitutional: Negative for activity change, chills, fatigue and fever  HENT: Positive for congestion  Negative for ear pain, sinus pressure and sore throat  Eyes: Negative for pain and visual disturbance  Respiratory: Negative for cough, chest tightness, shortness of breath and wheezing  Cardiovascular: Negative for chest pain, palpitations and leg swelling  Gastrointestinal: Negative for abdominal pain, blood in stool, constipation, diarrhea, nausea and vomiting  Endocrine: Negative for polydipsia and polyuria  Genitourinary: Negative for difficulty urinating, dysuria, frequency and urgency  Musculoskeletal: Negative for arthralgias, joint swelling and myalgias  Skin: Negative for rash  Allergic/Immunologic: Positive for environmental allergies  Neurological: Negative for dizziness, weakness, numbness and headaches  Hematological: Negative for adenopathy  Does not bruise/bleed easily  Psychiatric/Behavioral: Negative for dysphoric mood  The patient is not nervous/anxious         Past Medical History:     Past Medical History:   Diagnosis Date    Recurrent pregnancy loss, antepartum condition or complication     Renal calculi     Uterine leiomyoma     resolved 2014      Past Surgical History:     Past Surgical History:   Procedure Laterality Date    BREAST BIOPSY Left     DIAGNOSTIC LAPAROSCOPY      DILATION AND CURETTAGE OF UTERUS      HYSTEROSCOPY      SALPINGOOPHORECTOMY Left     hysteroscopy with D&C and laparoscopy with LSO for persistent cyst  Pathology notable for benign endometrium  and benign hemorrhagic cyst of the left ovary     TOOTH EXTRACTION        Social History:        Social History     Socioeconomic History    Marital status: /Civil Union     Spouse name: None    Number of children: 2    Years of education: None    Highest education level: None   Occupational History    None   Social Needs    Financial resource strain: None    Food insecurity     Worry: None     Inability: None    Transportation needs     Medical: None     Non-medical: None   Tobacco Use    Smoking status: Former Smoker     Last attempt to quit: 1984     Years since quittin 7    Smokeless tobacco: Never Used   Substance and Sexual Activity    Alcohol use: Yes     Frequency: Monthly or less     Comment: social, maybe 2 drinks/ month    Drug use: No    Sexual activity: None   Lifestyle    Physical activity     Days per week: None     Minutes per session: None    Stress: None   Relationships    Social connections     Talks on phone: None     Gets together: None     Attends Orthodoxy service: None     Active member of club or organization: None     Attends meetings of clubs or organizations: None     Relationship status: None    Intimate partner violence     Fear of current or ex partner: None     Emotionally abused: None     Physically abused: None     Forced sexual activity: None   Other Topics Concern    None   Social History Narrative    Caffeine use    Mandaeism affiliation Hinduism    Uses safety equipment seatbelts              Family History:     Family History   Problem Relation Age of Onset    Diabetes Mother     Hypertension Mother     Hypertension Father     Hypertension Sister     Hypertension Brother     Breast cancer Maternal Aunt [de-identified]        unspecified laterality    Breast cancer Cousin 61    Hypertension Family     No Known Problems Daughter     No Known Problems Maternal Grandmother     No Known Problems Maternal Grandfather     No Known Problems Paternal Grandmother     No Known Problems Paternal Grandfather     Hypertension Sister     No Known Problems Paternal Aunt       Current Medications:     Current Outpatient Medications   Medication Sig Dispense Refill    amLODIPine-benazepril (LOTREL) 10-20 MG per capsule Take 1 capsule by mouth daily 90 capsule 3    hydrOXYzine HCL (ATARAX) 10 mg tablet TAKE 2-3 TABLETS BY MOUTH EVERY 6 HOURS AS NEEDED FOR ANXIETY 60 tablet 0    Melatonin 5 MG CHEW daily at bedtime      metoprolol succinate (TOPROL-XL) 25 mg 24 hr tablet Take 1 tablet (25 mg total) by mouth daily 90 tablet 3    Multiple Vitamins-Iron (QC DAILY MULTIVITAMINS/IRON) TABS Take by mouth      simvastatin (ZOCOR) 20 mg tablet Take 1 tablet (20 mg total) by mouth daily 90 tablet 3     No current facility-administered medications for this visit  Allergies:     No Known Allergies   Physical Exam:     /70 (BP Location: Left arm, Patient Position: Sitting, Cuff Size: Standard)   Pulse 82   Temp 97 5 °F (36 4 °C)   Ht 5' 1" (1 549 m)   Wt 62 1 kg (137 lb)   SpO2 97%   BMI 25 89 kg/m²     Physical Exam  Vitals signs and nursing note reviewed  Constitutional:       Appearance: Normal appearance  She is well-developed  HENT:      Head: Normocephalic and atraumatic  Right Ear: External ear normal       Left Ear: External ear normal       Mouth/Throat:      Mouth: Mucous membranes are moist    Eyes:      Extraocular Movements: Extraocular movements intact  Pupils: Pupils are equal, round, and reactive to light  Neck:      Musculoskeletal: Normal range of motion and neck supple  Thyroid: No thyromegaly  Vascular: No carotid bruit  Cardiovascular:      Rate and Rhythm: Normal rate and regular rhythm  Pulses: Normal pulses  Heart sounds: Normal heart sounds  No murmur  Pulmonary:      Effort: Pulmonary effort is normal  No respiratory distress  Breath sounds: Normal breath sounds  Abdominal:      General: Bowel sounds are normal  There is no distension  Palpations: Abdomen is soft  There is no mass  Musculoskeletal: Normal range of motion  General: No swelling  Lymphadenopathy:      Cervical: No cervical adenopathy  Skin:     General: Skin is warm and dry  Findings: No erythema or rash  Neurological:      Mental Status: She is alert and oriented to person, place, and time  Cranial Nerves: No cranial nerve deficit        Deep Tendon Reflexes: Reflexes normal    Psychiatric:         Mood and Affect: Mood normal          Behavior: Behavior normal           MD Vira Jacobson [id number]

## 2020-10-08 ENCOUNTER — ULTRASOUND (OUTPATIENT)
Dept: OBGYN CLINIC | Facility: CLINIC | Age: 60
End: 2020-10-08
Payer: COMMERCIAL

## 2020-10-08 ENCOUNTER — OFFICE VISIT (OUTPATIENT)
Dept: OBGYN CLINIC | Facility: CLINIC | Age: 60
End: 2020-10-08
Payer: COMMERCIAL

## 2020-10-08 VITALS
SYSTOLIC BLOOD PRESSURE: 140 MMHG | BODY MASS INDEX: 26.47 KG/M2 | TEMPERATURE: 98 F | DIASTOLIC BLOOD PRESSURE: 78 MMHG | HEIGHT: 61 IN | WEIGHT: 140.2 LBS

## 2020-10-08 DIAGNOSIS — N95.2 ATROPHY OF VAGINA: Primary | ICD-10-CM

## 2020-10-08 DIAGNOSIS — R10.2 RIGHT ADNEXAL TENDERNESS: ICD-10-CM

## 2020-10-08 DIAGNOSIS — D21.9 FIBROIDS: Primary | ICD-10-CM

## 2020-10-08 PROCEDURE — 99213 OFFICE O/P EST LOW 20 MIN: CPT | Performed by: OBSTETRICS & GYNECOLOGY

## 2020-10-08 PROCEDURE — 76830 TRANSVAGINAL US NON-OB: CPT | Performed by: OBSTETRICS & GYNECOLOGY

## 2020-10-08 PROCEDURE — 1036F TOBACCO NON-USER: CPT | Performed by: OBSTETRICS & GYNECOLOGY

## 2020-10-08 PROCEDURE — 3078F DIAST BP <80 MM HG: CPT | Performed by: OBSTETRICS & GYNECOLOGY

## 2020-10-24 LAB
25(OH)D3 SERPL-MCNC: 30 NG/ML (ref 30–100)
ALBUMIN SERPL-MCNC: 4.3 G/DL (ref 3.6–5.1)
ALBUMIN/GLOB SERPL: 1.8 (CALC) (ref 1–2.5)
ALP SERPL-CCNC: 68 U/L (ref 37–153)
ALT SERPL-CCNC: 19 U/L (ref 6–29)
AST SERPL-CCNC: 18 U/L (ref 10–35)
BILIRUB SERPL-MCNC: 0.5 MG/DL (ref 0.2–1.2)
BUN SERPL-MCNC: 13 MG/DL (ref 7–25)
BUN/CREAT SERPL: ABNORMAL (CALC) (ref 6–22)
CALCIUM SERPL-MCNC: 9.5 MG/DL (ref 8.6–10.4)
CHLORIDE SERPL-SCNC: 104 MMOL/L (ref 98–110)
CHOLEST SERPL-MCNC: 203 MG/DL
CHOLEST/HDLC SERPL: 3.8 (CALC)
CO2 SERPL-SCNC: 31 MMOL/L (ref 20–32)
CREAT SERPL-MCNC: 0.71 MG/DL (ref 0.5–0.99)
EST. AVERAGE GLUCOSE BLD GHB EST-MCNC: 111 (CALC)
EST. AVERAGE GLUCOSE BLD GHB EST-SCNC: 6.2 (CALC)
GLOBULIN SER CALC-MCNC: 2.4 G/DL (CALC) (ref 1.9–3.7)
GLUCOSE SERPL-MCNC: 100 MG/DL (ref 65–99)
HBA1C MFR BLD: 5.5 % OF TOTAL HGB
HDLC SERPL-MCNC: 54 MG/DL
LDLC SERPL CALC-MCNC: 121 MG/DL (CALC)
NONHDLC SERPL-MCNC: 149 MG/DL (CALC)
POTASSIUM SERPL-SCNC: 5 MMOL/L (ref 3.5–5.3)
PROT SERPL-MCNC: 6.7 G/DL (ref 6.1–8.1)
SL AMB EGFR AFRICAN AMERICAN: 106 ML/MIN/1.73M2
SL AMB EGFR NON AFRICAN AMERICAN: 91 ML/MIN/1.73M2
SODIUM SERPL-SCNC: 141 MMOL/L (ref 135–146)
TRIGL SERPL-MCNC: 160 MG/DL

## 2021-01-29 DIAGNOSIS — F41.9 ANXIETY: ICD-10-CM

## 2021-01-29 RX ORDER — HYDROXYZINE HYDROCHLORIDE 10 MG/1
TABLET, FILM COATED ORAL
Qty: 60 TABLET | Refills: 0 | Status: SHIPPED | OUTPATIENT
Start: 2021-01-29 | End: 2021-02-04 | Stop reason: DRUGHIGH

## 2021-02-04 DIAGNOSIS — F41.9 ANXIETY: Primary | ICD-10-CM

## 2021-02-04 RX ORDER — HYDROXYZINE PAMOATE 25 MG/1
25 CAPSULE ORAL 3 TIMES DAILY PRN
Qty: 60 CAPSULE | Refills: 0 | Status: SHIPPED | OUTPATIENT
Start: 2021-02-04 | End: 2021-03-01 | Stop reason: SDUPTHER

## 2021-03-01 DIAGNOSIS — F41.9 ANXIETY: ICD-10-CM

## 2021-03-01 RX ORDER — HYDROXYZINE PAMOATE 25 MG/1
25 CAPSULE ORAL 3 TIMES DAILY PRN
Qty: 90 CAPSULE | Refills: 1 | Status: SHIPPED | OUTPATIENT
Start: 2021-03-01 | End: 2021-03-29

## 2021-03-10 ENCOUNTER — OFFICE VISIT (OUTPATIENT)
Dept: FAMILY MEDICINE CLINIC | Facility: CLINIC | Age: 61
End: 2021-03-10
Payer: COMMERCIAL

## 2021-03-10 VITALS
SYSTOLIC BLOOD PRESSURE: 140 MMHG | WEIGHT: 148.6 LBS | RESPIRATION RATE: 12 BRPM | DIASTOLIC BLOOD PRESSURE: 86 MMHG | BODY MASS INDEX: 28.05 KG/M2 | HEART RATE: 82 BPM | HEIGHT: 61 IN | OXYGEN SATURATION: 97 %

## 2021-03-10 DIAGNOSIS — I10 ESSENTIAL HYPERTENSION: ICD-10-CM

## 2021-03-10 DIAGNOSIS — I47.1 SVT (SUPRAVENTRICULAR TACHYCARDIA) (HCC): ICD-10-CM

## 2021-03-10 DIAGNOSIS — Z23 ENCOUNTER FOR IMMUNIZATION: ICD-10-CM

## 2021-03-10 DIAGNOSIS — F41.9 ANXIETY: Primary | ICD-10-CM

## 2021-03-10 PROCEDURE — 3725F SCREEN DEPRESSION PERFORMED: CPT | Performed by: FAMILY MEDICINE

## 2021-03-10 PROCEDURE — 1036F TOBACCO NON-USER: CPT | Performed by: FAMILY MEDICINE

## 2021-03-10 PROCEDURE — 99214 OFFICE O/P EST MOD 30 MIN: CPT | Performed by: FAMILY MEDICINE

## 2021-03-10 RX ORDER — HYDROXYZINE HYDROCHLORIDE 10 MG/1
TABLET, FILM COATED ORAL
Qty: 60 TABLET | Refills: 1 | Status: SHIPPED | OUTPATIENT
Start: 2021-03-10 | End: 2021-03-11 | Stop reason: SDUPTHER

## 2021-03-10 RX ORDER — VITAMIN E/VITAMINS A AND D
CREAM (GRAM) TOPICAL
COMMUNITY
Start: 2021-01-01 | End: 2022-06-28

## 2021-03-10 RX ORDER — ESCITALOPRAM OXALATE 10 MG/1
TABLET ORAL
Qty: 30 TABLET | Refills: 2 | Status: SHIPPED | OUTPATIENT
Start: 2021-03-10 | End: 2021-03-11 | Stop reason: SDUPTHER

## 2021-03-10 NOTE — PROGRESS NOTES
Assessment/Plan:    SVT (supraventricular tachycardia) (HCC)  No recurrences  Stable on metoprolol 25 mg daily    Hypertension  BP is well controlled on current regimen  She will cont Lotrel and metoprolol    Anxiety  We will start Lexapro 10 mg  Recommend taking half tablet daily for the 1st week and then going to full tablet  Discussed possible side effects  She will call if any unusual side effects  She will continue hydroxyzine as needed  Usually she takes 25 mg t i d  Diagnoses and all orders for this visit:    Anxiety  -     escitalopram (LEXAPRO) 10 mg tablet; Take 1/2 tab po daily for 1 week then 1 po daily  -     hydrOXYzine HCL (ATARAX) 10 mg tablet; Take 1-2 po tid prn anxiety    Essential hypertension    SVT (supraventricular tachycardia) (HCC)    Other orders  -     RA Vitamin E-Vit A & D 13760 units CREA          Subjective:      Patient ID: Shazia Baer is a 56 y o  female  She is here for follow up  Lots of anxiety recently  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  The hydroxyzine helps but she still feels constant tension in her head and neck  BP fluctuates with systolics 074-679T  She feels ready to start daily medication for anxiety  The following portions of the patient's history were reviewed and updated as appropriate: allergies, current medications, past family history, past medical history, past social history, past surgical history and problem list     Review of Systems   Constitutional: Negative for activity change, chills, fatigue and fever  HENT: Negative for congestion, ear pain, sinus pressure and sore throat  Eyes: Negative for pain and visual disturbance  Respiratory: Negative for cough, chest tightness, shortness of breath and wheezing  Cardiovascular: Negative for chest pain, palpitations and leg swelling     Gastrointestinal: Negative for abdominal pain, blood in stool, constipation, diarrhea, nausea and vomiting  Endocrine: Negative for polydipsia and polyuria  Genitourinary: Negative for difficulty urinating, dysuria, frequency and urgency  Musculoskeletal: Negative for arthralgias, joint swelling and myalgias  Skin: Negative for rash  Neurological: Positive for headaches  Negative for dizziness, weakness and numbness  Hematological: Negative for adenopathy  Does not bruise/bleed easily  Psychiatric/Behavioral: Negative for dysphoric mood  The patient is nervous/anxious  Objective:      /86 (BP Location: Right arm, Patient Position: Sitting, Cuff Size: Standard)   Pulse 82   Resp 12   Ht 5' 1" (1 549 m)   Wt 67 4 kg (148 lb 9 6 oz)   SpO2 97%   BMI 28 08 kg/m²          Physical Exam  Vitals signs and nursing note reviewed  Constitutional:       Appearance: Normal appearance  HENT:      Head: Normocephalic and atraumatic  Mouth/Throat:      Mouth: Mucous membranes are moist    Neck:      Musculoskeletal: Normal range of motion and neck supple  Cardiovascular:      Rate and Rhythm: Normal rate and regular rhythm  Pulses: Normal pulses  Heart sounds: Normal heart sounds  Lymphadenopathy:      Cervical: No cervical adenopathy  Skin:     General: Skin is warm and dry  Neurological:      General: No focal deficit present  Mental Status: She is alert and oriented to person, place, and time     Psychiatric:         Mood and Affect: Mood normal          Behavior: Behavior normal

## 2021-03-10 NOTE — ASSESSMENT & PLAN NOTE
We will start Lexapro 10 mg  Recommend taking half tablet daily for the 1st week and then going to full tablet  Discussed possible side effects  She will call if any unusual side effects  She will continue hydroxyzine as needed  Usually she takes 25 mg t i d

## 2021-03-11 DIAGNOSIS — F41.9 ANXIETY: ICD-10-CM

## 2021-03-11 RX ORDER — ESCITALOPRAM OXALATE 10 MG/1
TABLET ORAL
Qty: 30 TABLET | Refills: 2 | Status: SHIPPED | OUTPATIENT
Start: 2021-03-11 | End: 2021-03-18 | Stop reason: SINTOL

## 2021-03-11 RX ORDER — ESCITALOPRAM OXALATE 10 MG/1
TABLET ORAL
Qty: 30 TABLET | Refills: 2 | OUTPATIENT
Start: 2021-03-11

## 2021-03-11 RX ORDER — HYDROXYZINE HYDROCHLORIDE 10 MG/1
TABLET, FILM COATED ORAL
Qty: 60 TABLET | Refills: 1 | Status: SHIPPED | OUTPATIENT
Start: 2021-03-11 | End: 2022-06-03 | Stop reason: SDUPTHER

## 2021-03-11 NOTE — TELEPHONE ENCOUNTER
Patient was seen today medication was sent to the wrong pharmacy please resend to her pharmacy not to the mail delivery

## 2021-03-18 ENCOUNTER — TELEMEDICINE (OUTPATIENT)
Dept: FAMILY MEDICINE CLINIC | Facility: CLINIC | Age: 61
End: 2021-03-18
Payer: COMMERCIAL

## 2021-03-18 VITALS — SYSTOLIC BLOOD PRESSURE: 152 MMHG | TEMPERATURE: 97.3 F | DIASTOLIC BLOOD PRESSURE: 76 MMHG

## 2021-03-18 DIAGNOSIS — F41.9 ANXIETY: Primary | ICD-10-CM

## 2021-03-18 PROCEDURE — 99442 PR PHYS/QHP TELEPHONE EVALUATION 11-20 MIN: CPT | Performed by: FAMILY MEDICINE

## 2021-03-18 RX ORDER — DULOXETIN HYDROCHLORIDE 30 MG/1
CAPSULE, DELAYED RELEASE ORAL
Qty: 60 CAPSULE | Refills: 1 | Status: SHIPPED | OUTPATIENT
Start: 2021-03-18 | End: 2021-08-20 | Stop reason: SINTOL

## 2021-03-18 NOTE — PROGRESS NOTES
Virtual Brief Visit    Assessment/Plan:    Problem List Items Addressed This Visit        Other    Anxiety - Primary       She developed increased feeling of agitation and anxiety after starting Lexapro 5 milligrams daily  Will discontinue Lexapro and start Cymbalta 30 milligrams daily for 1-2 weeks for increasing to 60 milligrams daily  She may continue with hydroxyzine as needed  Will plan follow-up in a few weeks  Relevant Medications    DULoxetine (CYMBALTA) 30 mg delayed release capsule                Reason for visit is   Chief Complaint   Patient presents with    med issue    Virtual Brief Visit        Encounter provider Nitin Spaulding MD    Provider located at [address]  [address] [place] [address] [phone]    Recent Visits  No visits were found meeting these conditions  Showing recent visits within past 7 days and meeting all other requirements     Today's Visits  Date Type Provider Dept   03/18/21 David Clay MD [place] [address] [facility] Care   Showing today's visits and meeting all other requirements     Future Appointments  No visits were found meeting these conditions  Showing future appointments within next 150 days and meeting all other requirements        After connecting through telephone, the patient was identified by name and date of birth  Gianfranco Hernandez was informed that this is a telemedicine visit and that the visit is being conducted through telephone  My office door was closed  No one else was in the room  She acknowledged consent and understanding of privacy and security of the platform  The patient has agreed to participate and understands she can discontinue the visit at any time  It was my intent to perform this visit via video technology but the patient was not able to do a video connection so the visit was completed via audio telephone only      Patient is aware this is a billable service  Subjective    Daly Cain is a 56 y o  female   She is having virtual visit by phone call to discuss increased anxiety symptoms ever since she started taking Lexapro 5 mg daily  She states she as become even more anxious and is taking more hydroxyzine than before and she is very concerned because she is due to increase dose to 10 milligrams daily  Past Medical History:   Diagnosis Date    Recurrent pregnancy loss, antepartum condition or complication     Renal calculi     Uterine leiomyoma     resolved 06/04/2014       Past Surgical History:   Procedure Laterality Date    BREAST BIOPSY Left     DIAGNOSTIC LAPAROSCOPY      DILATION AND CURETTAGE OF UTERUS      HYSTEROSCOPY      SALPINGOOPHORECTOMY Left     hysteroscopy with D&C and laparoscopy with LSO for persistent cyst  Pathology notable for benign endometrium  and benign hemorrhagic cyst of the left ovary     TOOTH EXTRACTION         Current Outpatient Medications   Medication Sig Dispense Refill    amLODIPine-benazepril (LOTREL) 10-20 MG per capsule Take 1 capsule by mouth daily 90 capsule 3    hydrOXYzine HCL (ATARAX) 10 mg tablet Take 1-2 po tid prn anxiety 60 tablet 1    hydrOXYzine pamoate (VISTARIL) 25 mg capsule Take 1 capsule (25 mg total) by mouth 3 (three) times a day as needed for anxiety 90 capsule 1    metoprolol succinate (TOPROL-XL) 25 mg 24 hr tablet Take 1 tablet (25 mg total) by mouth daily 90 tablet 3    Multiple Vitamins-Iron (QC DAILY MULTIVITAMINS/IRON) TABS Take by mouth      RA Vitamin E-Vit A & D 29835 units CREA       simvastatin (ZOCOR) 20 mg tablet Take 1 tablet (20 mg total) by mouth daily 90 tablet 3    DULoxetine (CYMBALTA) 30 mg delayed release capsule Take 1 po daily for 1-2 weeks, then increase to 2 po daily 60 capsule 1     No current facility-administered medications for this visit           No Known Allergies    Review of Systems   Constitutional: Negative for activity change, chills, fatigue and fever  HENT: Negative for congestion  Neurological: Negative for dizziness and headaches  Psychiatric/Behavioral: Positive for sleep disturbance  Negative for dysphoric mood  The patient is nervous/anxious  Vitals:    03/18/21 1556   BP: 152/76   Temp: (!) 97 3 °F (36 3 °C)         I spent 14 minutes with patient today in which greater than 50% of the time was spent in counseling/coordination of care regarding instructions for management    VIRTUAL VISIT DISCLAIMER    Neha Cavanaugh acknowledges that she has consented to an online visit or consultation  She understands that the online visit is based solely on information provided by her, and that, in the absence of a face-to-face physical evaluation by the physician, the diagnosis she receives is both limited and provisional in terms of accuracy and completeness  This is not intended to replace a full medical face-to-face evaluation by the physician  Neha Cavanaugh understands and accepts these terms

## 2021-03-18 NOTE — ASSESSMENT & PLAN NOTE
She developed increased feeling of agitation and anxiety after starting Lexapro 5 milligrams daily  Will discontinue Lexapro and start Cymbalta 30 milligrams daily for 1-2 weeks for increasing to 60 milligrams daily  She may continue with hydroxyzine as needed  Will plan follow-up in a few weeks

## 2021-03-29 DIAGNOSIS — F41.9 ANXIETY: ICD-10-CM

## 2021-03-29 RX ORDER — HYDROXYZINE PAMOATE 25 MG/1
25 CAPSULE ORAL 3 TIMES DAILY PRN
Qty: 90 CAPSULE | Refills: 1 | Status: SHIPPED | OUTPATIENT
Start: 2021-03-29 | End: 2021-04-26

## 2021-04-25 DIAGNOSIS — F41.9 ANXIETY: ICD-10-CM

## 2021-04-26 RX ORDER — HYDROXYZINE PAMOATE 25 MG/1
25 CAPSULE ORAL 3 TIMES DAILY PRN
Qty: 90 CAPSULE | Refills: 0 | Status: SHIPPED | OUTPATIENT
Start: 2021-04-26 | End: 2021-05-19

## 2021-04-29 ENCOUNTER — HOSPITAL ENCOUNTER (OUTPATIENT)
Dept: MAMMOGRAPHY | Facility: MEDICAL CENTER | Age: 61
Discharge: HOME | End: 2021-04-29
Payer: COMMERCIAL

## 2021-04-29 VITALS — BODY MASS INDEX: 27.94 KG/M2 | WEIGHT: 148 LBS | HEIGHT: 61 IN

## 2021-04-29 DIAGNOSIS — Z12.31 ENCOUNTER FOR SCREENING MAMMOGRAM FOR MALIGNANT NEOPLASM OF BREAST: ICD-10-CM

## 2021-04-29 PROCEDURE — 77063 BREAST TOMOSYNTHESIS BI: CPT

## 2021-04-29 PROCEDURE — 77067 SCR MAMMO BI INCL CAD: CPT

## 2021-05-19 DIAGNOSIS — F41.9 ANXIETY: ICD-10-CM

## 2021-05-19 RX ORDER — HYDROXYZINE PAMOATE 25 MG/1
25 CAPSULE ORAL 3 TIMES DAILY PRN
Qty: 90 CAPSULE | Refills: 0 | Status: SHIPPED | OUTPATIENT
Start: 2021-05-19 | End: 2021-06-02

## 2021-06-02 DIAGNOSIS — F41.9 ANXIETY: ICD-10-CM

## 2021-06-02 RX ORDER — HYDROXYZINE PAMOATE 25 MG/1
25 CAPSULE ORAL 3 TIMES DAILY PRN
Qty: 270 CAPSULE | Refills: 1 | Status: SHIPPED | OUTPATIENT
Start: 2021-06-02 | End: 2022-06-28

## 2021-12-18 ENCOUNTER — NURSE TRIAGE (OUTPATIENT)
Dept: OTHER | Facility: OTHER | Age: 61
End: 2021-12-18

## 2021-12-18 DIAGNOSIS — Z20.822 ENCOUNTER FOR SCREENING LABORATORY TESTING FOR COVID-19 VIRUS: Primary | ICD-10-CM

## 2022-01-24 ENCOUNTER — VBI (OUTPATIENT)
Dept: ADMINISTRATIVE | Facility: OTHER | Age: 62
End: 2022-01-24

## 2022-04-25 ENCOUNTER — OFFICE VISIT (OUTPATIENT)
Dept: OBGYN CLINIC | Facility: CLINIC | Age: 62
End: 2022-04-25
Payer: COMMERCIAL

## 2022-04-25 VITALS
WEIGHT: 141 LBS | DIASTOLIC BLOOD PRESSURE: 82 MMHG | SYSTOLIC BLOOD PRESSURE: 124 MMHG | BODY MASS INDEX: 24.98 KG/M2 | HEIGHT: 63 IN

## 2022-04-25 DIAGNOSIS — N95.2 ATROPHY OF VAGINA: ICD-10-CM

## 2022-04-25 DIAGNOSIS — Z01.419 WOMEN'S ANNUAL ROUTINE GYNECOLOGICAL EXAMINATION: Primary | ICD-10-CM

## 2022-04-25 DIAGNOSIS — Z12.31 ENCOUNTER FOR SCREENING MAMMOGRAM FOR MALIGNANT NEOPLASM OF BREAST: ICD-10-CM

## 2022-04-25 DIAGNOSIS — N81.11 MIDLINE CYSTOCELE: ICD-10-CM

## 2022-04-25 PROCEDURE — 1036F TOBACCO NON-USER: CPT | Performed by: OBSTETRICS & GYNECOLOGY

## 2022-04-25 PROCEDURE — 99396 PREV VISIT EST AGE 40-64: CPT | Performed by: OBSTETRICS & GYNECOLOGY

## 2022-04-25 PROCEDURE — 3008F BODY MASS INDEX DOCD: CPT | Performed by: OBSTETRICS & GYNECOLOGY

## 2022-04-25 NOTE — PATIENT INSTRUCTIONS
Vaginal Atrophy   WHAT YOU NEED TO KNOW:   What is vaginal atrophy? Vaginal atrophy is a condition that causes thinning, drying, and inflammation of vaginal tissue  This condition is caused by decreased levels of estrogen (a female sex hormone)  Vaginal atrophy can increase your risk for vaginal and urinary tract infections  Vaginal atrophy can worsen over time if not treated  What causes or increases your risk of vaginal atrophy? · Menopause     · Medicines that lower your estrogen levels, such as those used to treat breast cancer, endometriosis, or fibroids    · Radiation to your pelvic area     · Surgery to remove the ovaries    · Breastfeeding    What are the signs and symptoms of vaginal atrophy? · Vaginal dryness, itching, and burning    · Vaginal discharge    · Pain or discomfort during sex    · Light bleeding after sex    · Burning during urination    · Frequent, sudden, strong urges to urinate    · Urinary incontinence (loss of control of your bladder)    How is vaginal atrophy diagnosed? Your healthcare provider will ask about your symptoms  A pelvic exam will be done to examine your vagina and cervix  Your healthcare provider will place a speculum into your vagina to open and examine it  A sample of discharge from your vagina may be collected and tested  A urine test may also be done  How is vaginal atrophy treated? · Over-the counter vaginal moisturizers  can help reduce dryness  Your healthcare provider may recommend that you use a vaginal moisturizer several times each week and during sex  Only use creams that are made for vaginal use  Do  not  use petroleum jelly  Lubricants can be used during sex to decrease pain and discomfort  · Estrogen  may help decrease dryness  It may also lower your risk of vaginal infections if you are going through menopause  It can also help to relieve urinary symptoms  Estrogen may be prescribed in the form of a cream, tablet, or ring   These medicines can be applied or inserted into the vagina  Estrogen can also be prescribed in the form of a pill  When should I contact my healthcare provider? · You have a foul-smelling odor coming from your vagina  · You have a thick, cheese-like discharge from your vagina  · You have itching, swelling, or redness in your vagina  · You have pain or burning when you urinate  · Your urine smells bad  · Your symptoms do not improve, or they get worse  · You have questions or concerns about your condition or care  CARE AGREEMENT:   You have the right to help plan your care  Learn about your health condition and how it may be treated  Discuss treatment options with your healthcare providers to decide what care you want to receive  You always have the right to refuse treatment  The above information is an  only  It is not intended as medical advice for individual conditions or treatments  Talk to your doctor, nurse or pharmacist before following any medical regimen to see if it is safe and effective for you  © Copyright [employer] 2022 Information is for End User's use only and may not be sold, redistributed or otherwise used for commercial purposes   All illustrations and images included in CareNotes® are the copyrighted property of [employer]  or [address] [place] [address] [employer]

## 2022-04-25 NOTE — PROGRESS NOTES
Assessment/Plan   Diagnoses and all orders for this visit:    Women's annual routine gynecological examination    Encounter for screening mammogram for malignant neoplasm of breast  -     Mammo screening bilateral w 3d & cad; Future    Atrophy of vagina    Midline cystocele    Other orders  -     Cholecalciferol 50 MCG (2000 UT) CAPS; Take 1 capsule by mouth    1  Yearly exam-Pap smear deferred, self-breast awareness reviewed, calcium/vitamin-D recommendations discussed, mammogram request given, colonoscopy as per specialist   2  Prior right adnexal fullness/abdominal twinges-denies any complaints  Had ultrasound at last visit 10/8/20 which was unremarkable  To call or return with any issues  3  Mild vaginal atrophy-denies any current complaints  Vaginal lubrication/moisturizer sheet given, to use accordingly  4  History uterine myoma-none noted on previous ultrasound 10/8/20   5  Minimal cystocele/rectocele-noted on exam   Patient denies any urinary or bowel movement symptoms  Kegel's exercises were reviewed and paperwork given  Recommend quick treatment for cough/colds and avoidance of straining with bowel movements  To call with any issues  6  Other-patient retired 2 years ago with pandemic  Daughter Amie Gottlieb had a baby now 3year-old, status post 1st birthday party this weekend  My congratulations were given  Follow-up 1 year for yearly exam as needed  Subjective   Patient ID: Daly Cain is a 59 y o  female  Vitals:    04/25/22 0755   BP: 124/82     Was seen today for yearly exam   Please see assessment plan for details        The following portions of the patient's history were reviewed and updated as appropriate: allergies, current medications, past family history, past medical history, past social history, past surgical history and problem list   Past Medical History:   Diagnosis Date    Recurrent pregnancy loss, antepartum condition or complication     Renal calculi     Uterine leiomyoma resolved 2014     Past Surgical History:   Procedure Laterality Date    BREAST BIOPSY Left     DIAGNOSTIC LAPAROSCOPY      DILATION AND CURETTAGE OF UTERUS      HYSTEROSCOPY      SALPINGOOPHORECTOMY Left     hysteroscopy with D&C and laparoscopy with LSO for persistent cyst  Pathology notable for benign endometrium  and benign hemorrhagic cyst of the left ovary     TOOTH EXTRACTION       OB History    Para Term  AB Living   3 2 2   1 2   SAB IAB Ectopic Multiple Live Births   1              # Outcome Date GA Lbr Mark Anthony/2nd Weight Sex Delivery Anes PTL Lv   3 Term            2 Term            1 SAB                Current Outpatient Medications:     amLODIPine-benazepril (LOTREL) 10-20 MG per capsule, Take 1 capsule by mouth daily, Disp: 90 capsule, Rfl: 3    Cholecalciferol 50 MCG ( UT) CAPS, Take 1 capsule by mouth, Disp: , Rfl:     hydrOXYzine HCL (ATARAX) 10 mg tablet, Take 1-2 po tid prn anxiety, Disp: 60 tablet, Rfl: 1    hydrOXYzine pamoate (VISTARIL) 25 mg capsule, TAKE 1 CAPSULE (25 MG TOTAL) BY MOUTH 3 (THREE) TIMES A DAY AS NEEDED FOR ANXIETY, Disp: 270 capsule, Rfl: 1    metoprolol succinate (TOPROL-XL) 25 mg 24 hr tablet, Take 1 tablet (25 mg total) by mouth daily, Disp: 90 tablet, Rfl: 3    Multiple Vitamins-Iron (QC DAILY MULTIVITAMINS/IRON) TABS, Take by mouth, Disp: , Rfl:     simvastatin (ZOCOR) 20 mg tablet, Take 1 tablet (20 mg total) by mouth daily, Disp: 90 tablet, Rfl: 3    RA Vitamin E-Vit A & D 94170 units CREA, , Disp: , Rfl:   No Known Allergies  Social History     Socioeconomic History    Marital status: /Civil Union     Spouse name: None    Number of children: 2    Years of education: None    Highest education level: None   Occupational History    None   Tobacco Use    Smoking status: Former Smoker     Quit date: 1984     Years since quittin 3    Smokeless tobacco: Never Used   Vaping Use    Vaping Use: Never used   Substance and Sexual Activity    Alcohol use: Yes     Comment: social, maybe 2 drinks/ month    Drug use: No    Sexual activity: Not Currently   Other Topics Concern    None   Social History Narrative    Caffeine use    Taoism affiliation Alevism    Uses safety equipment seatbelts             Social Determinants of Health     Financial Resource Strain: Not on file   Food Insecurity: Not on file   Transportation Needs: Not on file   Physical Activity: Not on file   Stress: Not on file   Social Connections: Not on file   Intimate Partner Violence: Not on file   Housing Stability: Not on file     Family History   Problem Relation Age of Onset    Diabetes Mother     Hypertension Mother     Hypertension Father     Hypertension Sister     Hypertension Brother     Breast cancer Maternal Aunt 80        unspecified laterality    Breast cancer Cousin 61    Hypertension Family     No Known Problems Daughter     No Known Problems Maternal Grandmother     No Known Problems Maternal Grandfather     No Known Problems Paternal Grandmother     No Known Problems Paternal Grandfather     Hypertension Sister     No Known Problems Paternal Aunt        Review of Systems   Constitutional: Negative for chills, diaphoresis, fatigue and fever  Respiratory: Negative for apnea, cough, chest tightness, shortness of breath and wheezing  Cardiovascular: Negative for chest pain, palpitations and leg swelling  Gastrointestinal: Negative for abdominal distention, abdominal pain, anal bleeding, constipation, diarrhea, nausea, rectal pain and vomiting  Genitourinary: Negative for difficulty urinating, dyspareunia, dysuria, frequency, hematuria, menstrual problem, pelvic pain, urgency, vaginal bleeding, vaginal discharge and vaginal pain  Musculoskeletal: Negative for arthralgias, back pain and myalgias  Skin: Negative for color change and rash     Neurological: Negative for dizziness, syncope, light-headedness, numbness and headaches  Hematological: Negative for adenopathy  Does not bruise/bleed easily  Psychiatric/Behavioral: Negative for dysphoric mood and sleep disturbance  The patient is not nervous/anxious  Objective   Physical Exam  OBGyn Exam     Objective      /82 (BP Location: Left arm, Patient Position: Sitting, Cuff Size: Adult)   Ht 5' 2 5" (1 588 m)   Wt 64 kg (141 lb)   BMI 25 38 kg/m²     General:   alert and oriented, in no acute distress   Neck: normal to inspection and palpation   Breast: normal appearance, no masses or tenderness   Heart:    Lungs:    Abdomen: soft, non-tender, without masses or organomegaly   Vulva: normal   Vagina: Mildly atrophic, without erythema or lesions or discharge  Small grade 1 cystocele noted   Cervix: Mildly atrophic, without lesions or discharge or cervicitis  No Cervical motion tenderness    Good support of the cervix   Uterus: top normal size, anteverted, non-tender   Adnexa: no mass, fullness, tenderness   Rectum: negative, minimal grade 1 rectocele    Psych:  Normal mood and affect   Skin:  Without obvious lesions   Eyes: symmetric, with normal movements and reactivity   Musculoskeletal:  Normal muscle tone and movements appreciated

## 2022-05-05 ENCOUNTER — HOSPITAL ENCOUNTER (OUTPATIENT)
Dept: MAMMOGRAPHY | Facility: MEDICAL CENTER | Age: 62
Discharge: HOME | End: 2022-05-05
Payer: COMMERCIAL

## 2022-05-05 VITALS — HEIGHT: 63 IN | WEIGHT: 141.09 LBS | BODY MASS INDEX: 25 KG/M2

## 2022-05-05 DIAGNOSIS — Z12.31 ENCOUNTER FOR SCREENING MAMMOGRAM FOR MALIGNANT NEOPLASM OF BREAST: ICD-10-CM

## 2022-05-05 PROCEDURE — 77063 BREAST TOMOSYNTHESIS BI: CPT

## 2022-05-05 PROCEDURE — 77067 SCR MAMMO BI INCL CAD: CPT

## 2022-06-02 ENCOUNTER — OFFICE VISIT (OUTPATIENT)
Dept: FAMILY MEDICINE CLINIC | Facility: CLINIC | Age: 62
End: 2022-06-02
Payer: COMMERCIAL

## 2022-06-02 VITALS
BODY MASS INDEX: 24.1 KG/M2 | RESPIRATION RATE: 12 BRPM | DIASTOLIC BLOOD PRESSURE: 76 MMHG | HEIGHT: 63 IN | HEART RATE: 87 BPM | OXYGEN SATURATION: 96 % | WEIGHT: 136 LBS | SYSTOLIC BLOOD PRESSURE: 118 MMHG

## 2022-06-02 DIAGNOSIS — I10 ESSENTIAL HYPERTENSION: ICD-10-CM

## 2022-06-02 DIAGNOSIS — R73.9 HYPERGLYCEMIA: ICD-10-CM

## 2022-06-02 DIAGNOSIS — E78.2 MIXED HYPERLIPIDEMIA: ICD-10-CM

## 2022-06-02 DIAGNOSIS — J20.9 ACUTE BRONCHITIS, UNSPECIFIED ORGANISM: Primary | ICD-10-CM

## 2022-06-02 PROCEDURE — 3725F SCREEN DEPRESSION PERFORMED: CPT | Performed by: FAMILY MEDICINE

## 2022-06-02 PROCEDURE — 99204 OFFICE O/P NEW MOD 45 MIN: CPT | Performed by: FAMILY MEDICINE

## 2022-06-02 RX ORDER — FLUTICASONE PROPIONATE 44 MCG
AEROSOL WITH ADAPTER (GRAM) INHALATION
COMMUNITY
Start: 2022-05-27 | End: 2022-06-28

## 2022-06-02 RX ORDER — BENZONATATE 100 MG/1
CAPSULE ORAL
COMMUNITY
Start: 2022-05-27 | End: 2022-06-28

## 2022-06-02 RX ORDER — ALBUTEROL SULFATE 90 UG/1
AEROSOL, METERED RESPIRATORY (INHALATION)
COMMUNITY
Start: 2022-05-27 | End: 2022-06-28

## 2022-06-02 RX ORDER — AZITHROMYCIN 250 MG/1
TABLET, FILM COATED ORAL
COMMUNITY
Start: 2022-05-27 | End: 2022-06-28

## 2022-06-02 RX ORDER — AMLODIPINE BESYLATE AND BENAZEPRIL HYDROCHLORIDE 10; 20 MG/1; MG/1
1 CAPSULE ORAL DAILY
Qty: 90 CAPSULE | Refills: 3 | Status: SHIPPED | OUTPATIENT
Start: 2022-06-02

## 2022-06-02 NOTE — PATIENT INSTRUCTIONS
Please try to get cc of CXR     Use the albuterol at bedtime and daytime if needed   Tessalon perles  - can continue    Robitussin DM as directed on bottle during the day as needed for coughing      Fasting labs    Return visit for full PE

## 2022-06-02 NOTE — PROGRESS NOTES
FAMILY PRACTICE OFFICE VISIT    NAME: Phan Duval    AGE: 59 y o  SEX: female  : 1960   MRN: [id number]    DATE: 2022  TIME: 10:25 AM    Assessment and Plan     1  Essential hypertension  Well controlled  Remain on current meds  Discussed that there is a possible adverse drug interaction with the norvasc and statin  Will have pt return for PE  And review labs - if lipids remain well controlled - can consider decreasing zocor to 10 mg daily - to help lessen drug interactions    - amLODIPine-benazepril (LOTREL) 10-20 MG per capsule; Take 1 capsule by mouth daily  Dispense: 90 capsule; Refill: 3    2  Acute bronchitis, unspecified organism  Supportive care - see below  Reviewed note from pulmonary and also cxr report in pulm note    Will avoid phenergan with codeine due to h/o SVT and possiblity that codeine could cause tachycardia      Patient Instructions   Please try to get cc of CXR     Use the albuterol at bedtime and daytime if needed   Tessalon perles  - can continue    Robitussin DM as directed on bottle during the day as needed for coughing  Fasting labs    Return visit for full PE      Chief Complaint     Chief Complaint   Patient presents with    Annual Exam       History of Present Illness   Phan Duval is a 59y o -year-old female who presents today for routine initial visit as a new/old patient  Pt went to urgent care 6 days ago due to not feeling well  Tested (-) for covid  Was given rx for flovent , ventolin , tessalon perles and took a z-roberta  Also taking mucinex  Pt had a CXR - no report for review but reviewed note from pulmonary    Pt not a smoker        Went to pulmonary yesterday who ordered some tests - pft's        Review of Systems   Review of Systems   Constitutional: Negative for fever  HENT: Positive for postnasal drip and voice change  Hoarseness    Laryngitis is improving     Respiratory: Positive for cough  Negative for shortness of breath and wheezing  Cough occasionally productive  Slightly colored    mucinex dries up cough   Cardiovascular: Negative for chest pain and palpitations         Active Problem List     Patient Active Problem List   Diagnosis    Atrophy of vagina    Midline cystocele    Dyspareunia    Hyperlipidemia    Hypertension    Seborrheic keratosis    Anxiety    Precordial pain    SVT (supraventricular tachycardia) (HCC)         Past Medical History:  Past Medical History:   Diagnosis Date    Recurrent pregnancy loss, antepartum condition or complication     Renal calculi     Uterine leiomyoma     resolved 06/04/2014       Past Surgical History:  Past Surgical History:   Procedure Laterality Date    BREAST BIOPSY Left     DIAGNOSTIC LAPAROSCOPY      DILATION AND CURETTAGE OF UTERUS      HYSTEROSCOPY      SALPINGOOPHORECTOMY Left     hysteroscopy with D&C and laparoscopy with LSO for persistent cyst  Pathology notable for benign endometrium  and benign hemorrhagic cyst of the left ovary     TOOTH EXTRACTION         Family History:  Family History   Problem Relation Age of Onset    Diabetes Mother     Hypertension Mother     Hypertension Father     Hypertension Sister     Hypertension Brother     Breast cancer Maternal Aunt 80        unspecified laterality    Breast cancer Cousin 61    Hypertension Family     No Known Problems Daughter     No Known Problems Maternal Grandmother     No Known Problems Maternal Grandfather     No Known Problems Paternal Grandmother     No Known Problems Paternal Grandfather     Hypertension Sister     No Known Problems Paternal Aunt        Social History:  Social History     Socioeconomic History    Marital status: /Civil Union     Spouse name: Not on file    Number of children: 2    Years of education: Not on file    Highest education level: Not on file   Occupational History    Not on file   Tobacco Use    Smoking status: Former Smoker     Quit date: 1/1/1984     Years since quittin 4    Smokeless tobacco: Never Used   Vaping Use    Vaping Use: Never used   Substance and Sexual Activity    Alcohol use: Yes     Comment: social, maybe 2 drinks/ month    Drug use: No    Sexual activity: Not Currently   Other Topics Concern    Not on file   Social History Narrative    Caffeine use    Zoroastrian affiliation Hoahaoism    Uses safety equipment seatbelts             Social Determinants of Health     Financial Resource Strain: Not on file   Food Insecurity: Not on file   Transportation Needs: Not on file   Physical Activity: Not on file   Stress: Not on file   Social Connections: Not on file   Intimate Partner Violence: Not on file   Housing Stability: Not on file       Objective     Vitals:    22 1018   BP: 118/76   Pulse: 87   Resp: 12   SpO2: 96%     Wt Readings from Last 3 Encounters:   22 61 7 kg (136 lb)   22 64 kg (141 lb 1 5 oz)   22 64 kg (141 lb)       Physical Exam  Vitals and nursing note reviewed  Constitutional:       Comments: Appears tired  Sounds hoarse  Nontoxic     HENT:      Right Ear: Tympanic membrane normal       Left Ear: Tympanic membrane normal       Mouth/Throat:      Mouth: Mucous membranes are moist       Pharynx: No oropharyngeal exudate or posterior oropharyngeal erythema  Eyes:      General: No scleral icterus  Right eye: No discharge  Left eye: No discharge  Cardiovascular:      Rate and Rhythm: Normal rate and regular rhythm  Pulses: Normal pulses  Heart sounds: Normal heart sounds  Pulmonary:      Effort: Pulmonary effort is normal  No respiratory distress  Breath sounds: Normal breath sounds  No wheezing, rhonchi or rales  Comments: No use of accessory respiratory muscles  Slight cough during visit but no SOB with conversation    Musculoskeletal:      Cervical back: Neck supple  Lymphadenopathy:      Cervical: No cervical adenopathy  Skin:     General: Skin is warm  Coloration: Skin is not jaundiced  Neurological:      General: No focal deficit present  Mental Status: She is alert and oriented to person, place, and time  Psychiatric:         Mood and Affect: Mood normal          Behavior: Behavior normal          Thought Content:  Thought content normal          Judgment: Judgment normal          Pertinent Laboratory/Diagnostic Studies:  Lab Results   Component Value Date    BUN 13 10/23/2020    CREATININE 0 71 10/23/2020    CALCIUM 9 5 10/23/2020     04/20/2016    K 5 0 10/23/2020    CO2 31 10/23/2020     10/23/2020     Lab Results   Component Value Date    ALT 19 10/23/2020    AST 18 10/23/2020    ALKPHOS 68 10/23/2020    BILITOT 0 4 04/20/2016       Lab Results   Component Value Date    WBC 4 7 06/11/2020    HGB 14 0 06/11/2020    HCT 42 7 06/11/2020    MCV 91 6 06/11/2020     06/11/2020       No results found for: TSH    Lab Results   Component Value Date    CHOL 199 04/20/2016     Lab Results   Component Value Date    TRIG 160 (H) 10/23/2020     Lab Results   Component Value Date    HDL 54 10/23/2020     Lab Results   Component Value Date    LDLCALC 121 (H) 10/23/2020     Lab Results   Component Value Date    HGBA1C 5 5 10/23/2020       Results for orders placed or performed in visit on 10/23/20   Lipid panel   Result Value Ref Range    Total Cholesterol 203 (H) <200 mg/dL    HDL 54 > OR = 50 mg/dL    Triglycerides 160 (H) <150 mg/dL    LDL Calculated 121 (H) mg/dL (calc)    Chol HDLC Ratio 3 8 <5 0 (calc)    Non-HDL Cholesterol 149 (H) <130 mg/dL (calc)   Comprehensive metabolic panel   Result Value Ref Range    Glucose, Random 100 (H) 65 - 99 mg/dL    BUN 13 7 - 25 mg/dL    Creatinine 0 71 0 50 - 0 99 mg/dL    eGFR Non  91 > OR = 60 mL/min/1 73m2    eGFR  106 > OR = 60 mL/min/1 73m2    SL AMB BUN/CREATININE RATIO NOT APPLICABLE 6 - 22 (calc)    Sodium 141 135 - 146 mmol/L    Potassium 5 0 3 5 - 5 3 mmol/L Chloride 104 98 - 110 mmol/L    CO2 31 20 - 32 mmol/L    Calcium 9 5 8 6 - 10 4 mg/dL    Protein, Total 6 7 6 1 - 8 1 g/dL    Albumin 4 3 3 6 - 5 1 g/dL    Globulin 2 4 1 9 - 3 7 g/dL (calc)    Albumin/Globulin Ratio 1 8 1 0 - 2 5 (calc)    TOTAL BILIRUBIN 0 5 0 2 - 1 2 mg/dL    Alkaline Phosphatase 68 37 - 153 U/L    AST 18 10 - 35 U/L    ALT 19 6 - 29 U/L   Vitamin D 25 hydroxy   Result Value Ref Range    Vitamin D, 25-Hydroxy, Serum 30 30 - 100 ng/mL   Hemoglobin A1C With EAG   Result Value Ref Range    Hemoglobin A1C 5 5 <5 7 % of total Hgb    Estimated Average Glucose 111 (calc)    Estimated Average Glucose (mmol/L) 6 2 (calc)       No orders of the defined types were placed in this encounter        ALLERGIES:  Allergies   Allergen Reactions    Duloxetine Anxiety     Worsened anxiety & depression    Escitalopram Anxiety     Worsening of anxiety and depressed       Current Medications     Current Outpatient Medications   Medication Sig Dispense Refill    albuterol (PROVENTIL HFA,VENTOLIN HFA) 90 mcg/act inhaler INHALE 2 PUFFS BY MOUTH EVERY 4 HOURS AS NEEDED FOR COUGH      amLODIPine-benazepril (LOTREL) 10-20 MG per capsule Take 1 capsule by mouth daily 90 capsule 3    azithromycin (ZITHROMAX) 250 mg tablet PLEASE SEE ATTACHED FOR DETAILED DIRECTIONS      benzonatate (TESSALON PERLES) 100 mg capsule TAKE 1 CAPSULE BY MOUTH THREE TIMES A DAY AS NEEDED FOR COUGH FOR 10 DAYS      Cholecalciferol 50 MCG (2000 UT) CAPS Take 1 capsule by mouth      Flovent HFA 44 MCG/ACT inhaler INHALE 2 PUFF (INHALATION) 2 TIMES PER DAY FOR 10 DAYS RINSE MOUTH AFTER USE      hydrOXYzine HCL (ATARAX) 10 mg tablet Take 1-2 po tid prn anxiety 60 tablet 1    hydrOXYzine pamoate (VISTARIL) 25 mg capsule TAKE 1 CAPSULE (25 MG TOTAL) BY MOUTH 3 (THREE) TIMES A DAY AS NEEDED FOR ANXIETY 270 capsule 1    metoprolol succinate (TOPROL-XL) 25 mg 24 hr tablet Take 1 tablet (25 mg total) by mouth daily 90 tablet 3    Multiple Vitamins-Iron (QC DAILY MULTIVITAMINS/IRON) TABS Take by mouth      simvastatin (ZOCOR) 20 mg tablet Take 1 tablet (20 mg total) by mouth daily 90 tablet 3    RA Vitamin E-Vit A & D 17153 units CREA  (Patient not taking: No sig reported)       No current facility-administered medications for this visit           Health Maintenance     Health Maintenance   Topic Date Due    COVID-19 Vaccine (4 - Booster for Pfizer series) 03/12/2022    Depression Screening  03/18/2022    Influenza Vaccine (Season Ended) 09/01/2022    Annual Physical  04/25/2023    Breast Cancer Screening: Mammogram  05/05/2023    BMI: Adult  06/02/2023    Colorectal Cancer Screening  03/07/2024    Cervical Cancer Screening  08/31/2025    DTaP,Tdap,and Td Vaccines (3 - Td or Tdap) 04/27/2027    HIV Screening  Completed    Hepatitis C Screening  Completed    Pneumococcal Vaccine: Pediatrics (0 to 5 Years) and At-Risk Patients (6 to 59 Years)  Aged Out    HIB Vaccine  Aged Out    Hepatitis B Vaccine  Aged Out    IPV Vaccine  Aged Out    Hepatitis A Vaccine  Aged Out    Meningococcal ACWY Vaccine  Aged Out    HPV Vaccine  Aged [employer] History   Administered Date(s) Administered    COVID-19 PFIZER VACCINE 0 3 ML IM 03/12/2021, 04/01/2021, 11/12/2021    Influenza Injectable, MDCK, Preservative Free, Quadrivalent, 0 5 mL 12/14/2018, 10/02/2019    Influenza Quadrivalent Preservative Free 3 years and older IM 12/14/2018, 10/02/2019, 10/17/2020    Influenza, injectable, quadrivalent, preservative free 0 5 mL 10/17/2020    Tdap 03/16/2016, 04/27/2017          Vera Zurita, DO

## 2022-06-03 ENCOUNTER — PATIENT MESSAGE (OUTPATIENT)
Dept: FAMILY MEDICINE CLINIC | Facility: CLINIC | Age: 62
End: 2022-06-03

## 2022-06-03 ENCOUNTER — TELEPHONE (OUTPATIENT)
Dept: FAMILY MEDICINE CLINIC | Facility: CLINIC | Age: 62
End: 2022-06-03

## 2022-06-03 DIAGNOSIS — F41.9 ANXIETY: ICD-10-CM

## 2022-06-03 RX ORDER — HYDROXYZINE HYDROCHLORIDE 10 MG/1
TABLET, FILM COATED ORAL
Qty: 60 TABLET | Refills: 1 | Status: SHIPPED | OUTPATIENT
Start: 2022-06-03 | End: 2022-06-03 | Stop reason: SDUPTHER

## 2022-06-03 NOTE — TELEPHONE ENCOUNTER
PT would like rx Hydroxyzine HCL 10mg Tablet to be sent to   [facility] in [address] [place] [address]

## 2022-06-23 ENCOUNTER — LAB (OUTPATIENT)
Dept: LAB | Facility: CLINIC | Age: 62
End: 2022-06-23
Payer: COMMERCIAL

## 2022-06-23 DIAGNOSIS — R73.9 HYPERGLYCEMIA: ICD-10-CM

## 2022-06-23 DIAGNOSIS — E78.2 MIXED HYPERLIPIDEMIA: ICD-10-CM

## 2022-06-23 LAB
ALBUMIN SERPL BCP-MCNC: 3.6 G/DL (ref 3.5–5)
ALP SERPL-CCNC: 71 U/L (ref 46–116)
ALT SERPL W P-5'-P-CCNC: 25 U/L (ref 12–78)
ANION GAP SERPL CALCULATED.3IONS-SCNC: 1 MMOL/L (ref 4–13)
AST SERPL W P-5'-P-CCNC: 17 U/L (ref 5–45)
BILIRUB SERPL-MCNC: 0.45 MG/DL (ref 0.2–1)
BUN SERPL-MCNC: 12 MG/DL (ref 5–25)
CALCIUM SERPL-MCNC: 9.2 MG/DL (ref 8.3–10.1)
CHLORIDE SERPL-SCNC: 105 MMOL/L (ref 100–108)
CHOLEST SERPL-MCNC: 194 MG/DL
CO2 SERPL-SCNC: 30 MMOL/L (ref 21–32)
CREAT SERPL-MCNC: 0.85 MG/DL (ref 0.6–1.3)
EST. AVERAGE GLUCOSE BLD GHB EST-MCNC: 114 MG/DL
GFR SERPL CREATININE-BSD FRML MDRD: 73 ML/MIN/1.73SQ M
GLUCOSE P FAST SERPL-MCNC: 97 MG/DL (ref 65–99)
HBA1C MFR BLD: 5.6 %
HDLC SERPL-MCNC: 47 MG/DL
LDLC SERPL CALC-MCNC: 117 MG/DL (ref 0–100)
NONHDLC SERPL-MCNC: 147 MG/DL
POTASSIUM SERPL-SCNC: 4.3 MMOL/L (ref 3.5–5.3)
PROT SERPL-MCNC: 7.4 G/DL (ref 6.4–8.2)
SODIUM SERPL-SCNC: 136 MMOL/L (ref 136–145)
TRIGL SERPL-MCNC: 148 MG/DL
TSH SERPL DL<=0.05 MIU/L-ACNC: 1.68 UIU/ML (ref 0.45–4.5)

## 2022-06-23 PROCEDURE — 80053 COMPREHEN METABOLIC PANEL: CPT

## 2022-06-23 PROCEDURE — 80061 LIPID PANEL: CPT

## 2022-06-23 PROCEDURE — 83036 HEMOGLOBIN GLYCOSYLATED A1C: CPT

## 2022-06-23 PROCEDURE — 84443 ASSAY THYROID STIM HORMONE: CPT

## 2022-06-23 PROCEDURE — 36415 COLL VENOUS BLD VENIPUNCTURE: CPT

## 2022-06-28 ENCOUNTER — OFFICE VISIT (OUTPATIENT)
Dept: FAMILY MEDICINE CLINIC | Facility: CLINIC | Age: 62
End: 2022-06-28
Payer: COMMERCIAL

## 2022-06-28 VITALS
HEIGHT: 63 IN | HEART RATE: 86 BPM | OXYGEN SATURATION: 97 % | TEMPERATURE: 97.6 F | DIASTOLIC BLOOD PRESSURE: 80 MMHG | RESPIRATION RATE: 16 BRPM | SYSTOLIC BLOOD PRESSURE: 122 MMHG | WEIGHT: 138.2 LBS | BODY MASS INDEX: 24.49 KG/M2

## 2022-06-28 DIAGNOSIS — I47.1 SVT (SUPRAVENTRICULAR TACHYCARDIA) (HCC): ICD-10-CM

## 2022-06-28 DIAGNOSIS — N81.11 MIDLINE CYSTOCELE: ICD-10-CM

## 2022-06-28 DIAGNOSIS — E78.01 FAMILIAL HYPERCHOLESTEROLEMIA: ICD-10-CM

## 2022-06-28 DIAGNOSIS — I10 ESSENTIAL HYPERTENSION: ICD-10-CM

## 2022-06-28 DIAGNOSIS — F41.9 ANXIETY: ICD-10-CM

## 2022-06-28 DIAGNOSIS — Z00.00 PHYSICAL EXAM: Primary | ICD-10-CM

## 2022-06-28 PROCEDURE — 3008F BODY MASS INDEX DOCD: CPT | Performed by: FAMILY MEDICINE

## 2022-06-28 PROCEDURE — 1036F TOBACCO NON-USER: CPT | Performed by: FAMILY MEDICINE

## 2022-06-28 PROCEDURE — 99396 PREV VISIT EST AGE 40-64: CPT | Performed by: FAMILY MEDICINE

## 2022-06-28 RX ORDER — AMLODIPINE BESYLATE AND BENAZEPRIL HYDROCHLORIDE 5; 20 MG/1; MG/1
1 CAPSULE ORAL DAILY
Qty: 90 CAPSULE | Refills: 1 | Status: SHIPPED | OUTPATIENT
Start: 2022-06-28

## 2022-06-28 NOTE — PROGRESS NOTES
FAMILY PRACTICE OFFICE VISIT    NAME: Tia Fitzpatrick    AGE: 59 y o  SEX: female  : 1960   MRN: [id number]    DATE: 2022  TIME: 4:13 PM    Assessment and Plan   1  Essential hypertension  Running low normal to low at home - along with some symptoms when low - so will decrease lotrel from 10/20 to 5/20 mg /day  This will also help lessen any potential adverse drug interactions with statin  Recommend ongoing monitoring of bp at home    - amLODIPine-benazepril (LOTREL 5-20) 5-20 MG per capsule; Take 1 capsule by mouth daily This reflects a decrease in dose as of 2022  Dispense: 90 capsule; Refill: 1    2  SVT (supraventricular tachycardia) (HCC)  Stable      3  Physical exam  Anticipatory guidance and preventative medicine discussed  Up to date with gyn exams, mammo and colonoscopy  Recent eye exam - has new glasses  Goes to dentist  Exercises regularly  Reviewed labs - normal    Recommend next covid booster in fall along with flu shot  Pt will consider shingrix  Is going for pft's soon - will have results sent for my review as well          4  Familial hypercholesterolemia  Good control  5  Anxiety  Stable  Has atarax on hand to take prn - but has not had to use  6  Midline cystocele         There are no Patient Instructions on file for this visit  Chief Complaint     Chief Complaint   Patient presents with    Physical Exam     Pt running low bp at home  When running low pt feels tingle in head, takes bp at time of feeling and bp is low  History of Present Illness   Tia Fitzpatrick is a 59y o -year-old female who presents today for annual PE  Brought along BP log      Review of Systems   Review of Systems   Constitutional: Negative for fever and unexpected weight change  HENT: Negative for congestion  Respiratory: Negative for cough, shortness of breath and wheezing  Has not had to use albuterol  Cardiovascular: Negative for chest pain and palpitations  Home BP's running low 100's to one-teens over 60's on avg  With some lower sbp readings in the 90's  Pt notices some 'tingling' in head when bp is running lower  Gastrointestinal: Negative for abdominal pain, blood in stool, constipation, diarrhea, nausea and vomiting  Genitourinary: Negative for dysuria, hematuria and vaginal bleeding  Musculoskeletal:        Does get some arthritis pain - mostly knees - and stiffness mostly In the am  Not taking anything for symptoms     Allergic/Immunologic: Negative for environmental allergies  Neurological: Negative for syncope  Psychiatric/Behavioral: Negative for dysphoric mood, self-injury, sleep disturbance and suicidal ideas  The patient is nervous/anxious  Anxiety well controlled           Active Problem List     Patient Active Problem List   Diagnosis    Atrophy of vagina    Midline cystocele    Dyspareunia    Hyperlipidemia    Hypertension    Seborrheic keratosis    Anxiety    Precordial pain    SVT (supraventricular tachycardia) (HCC)         Past Medical History:  Past Medical History:   Diagnosis Date    Recurrent pregnancy loss, antepartum condition or complication     Renal calculi     Uterine leiomyoma     resolved 06/04/2014       Past Surgical History:  Past Surgical History:   Procedure Laterality Date    BREAST BIOPSY Left     DIAGNOSTIC LAPAROSCOPY      DILATION AND CURETTAGE OF UTERUS      HYSTEROSCOPY      SALPINGOOPHORECTOMY Left     hysteroscopy with D&C and laparoscopy with LSO for persistent cyst  Pathology notable for benign endometrium  and benign hemorrhagic cyst of the left ovary     TOOTH EXTRACTION         Family History:  Family History   Problem Relation Age of Onset    Diabetes Mother     Hypertension Mother     Hypertension Father     Hypertension Sister     Hypertension Brother     Breast cancer Maternal Aunt 80        unspecified laterality    Breast cancer Cousin 61    Hypertension Family     No Known Problems Daughter     No Known Problems Maternal Grandmother     No Known Problems Maternal Grandfather     No Known Problems Paternal Grandmother     No Known Problems Paternal Grandfather     Hypertension Sister     No Known Problems Paternal Aunt        Social History:  Social History     Socioeconomic History    Marital status: /Civil Union     Spouse name: Not on file    Number of children: 2    Years of education: Not on file    Highest education level: Not on file   Occupational History    Not on file   Tobacco Use    Smoking status: Former Smoker     Quit date: 1984     Years since quittin 5    Smokeless tobacco: Never Used   Vaping Use    Vaping Use: Never used   Substance and Sexual Activity    Alcohol use: Yes     Comment: social, maybe 2 drinks/ month    Drug use: No    Sexual activity: Not Currently   Other Topics Concern    Not on file   Social History Narrative    Caffeine use    Yazidism affiliation [employer]    Uses safety equipment seatbelts             Social Determinants of Health     Financial Resource Strain: Not on file   Food Insecurity: Not on file   Transportation Needs: Not on file   Physical Activity: Not on file   Stress: Not on file   Social Connections: Not on file   Intimate Partner Violence: Not on file   Housing Stability: Not on file       Objective     Vitals:    22 1552   BP: 122/80   Pulse: 86   Resp: 16   Temp: 97 6 °F (36 4 °C)   SpO2: 97%     Wt Readings from Last 3 Encounters:   22 62 7 kg (138 lb 3 2 oz)   22 61 7 kg (136 lb)   22 64 kg (141 lb 1 5 oz)       Physical Exam  Vitals and nursing note reviewed  Constitutional:       General: She is not in acute distress  Appearance: Normal appearance  She is not ill-appearing or toxic-appearing  HENT:      Head: Normocephalic  Right Ear: Tympanic membrane normal       Left Ear: Tympanic membrane normal       Nose: No congestion or rhinorrhea  Mouth/Throat:      Mouth: Mucous membranes are moist       Pharynx: Oropharynx is clear  No oropharyngeal exudate or posterior oropharyngeal erythema  Comments: Mucous membranes moist and pink  No tonsillar exudates    Eyes:      General: No scleral icterus  Conjunctiva/sclera: Conjunctivae normal       Pupils: Pupils are equal, round, and reactive to light  Neck:      Vascular: No carotid bruit  Cardiovascular:      Rate and Rhythm: Normal rate and regular rhythm  Pulses: Normal pulses  Heart sounds: Normal heart sounds  No murmur heard  Pulmonary:      Effort: Pulmonary effort is normal  No respiratory distress  Breath sounds: Normal breath sounds  No stridor  No wheezing, rhonchi or rales  Abdominal:      General: Abdomen is flat  There is no distension  Palpations: There is no mass  Tenderness: There is no abdominal tenderness  There is no guarding or rebound  Hernia: No hernia is present  Musculoskeletal:         General: No swelling, tenderness or deformity  Cervical back: Normal range of motion and neck supple  No rigidity  No muscular tenderness  Right lower leg: No edema  Left lower leg: No edema  Comments: No calf tenderness   Lymphadenopathy:      Cervical: No cervical adenopathy  Skin:     General: Skin is warm  Capillary Refill: Capillary refill takes less than 2 seconds  Coloration: Skin is not jaundiced or pale  Findings: No lesion or rash  Neurological:      General: No focal deficit present  Mental Status: She is alert and oriented to person, place, and time  Cranial Nerves: No cranial nerve deficit  Sensory: No sensory deficit  Motor: No weakness  Gait: Gait normal       Deep Tendon Reflexes: Reflexes normal    Psychiatric:         Mood and Affect: Mood normal          Behavior: Behavior normal          Thought Content:  Thought content normal          Judgment: Judgment normal  Pertinent Laboratory/Diagnostic Studies:  Lab Results   Component Value Date    BUN 12 06/23/2022    CREATININE 0 85 06/23/2022    CALCIUM 9 2 06/23/2022     04/20/2016    K 4 3 06/23/2022    CO2 30 06/23/2022     06/23/2022     Lab Results   Component Value Date    ALT 25 06/23/2022    AST 17 06/23/2022    ALKPHOS 71 06/23/2022    BILITOT 0 4 04/20/2016       Lab Results   Component Value Date    WBC 4 7 06/11/2020    HGB 14 0 06/11/2020    HCT 42 7 06/11/2020    MCV 91 6 06/11/2020     06/11/2020       No results found for: TSH    Lab Results   Component Value Date    CHOL 199 04/20/2016     Lab Results   Component Value Date    TRIG 148 06/23/2022     Lab Results   Component Value Date    HDL 47 (L) 06/23/2022     Lab Results   Component Value Date    LDLCALC 117 (H) 06/23/2022     Lab Results   Component Value Date    HGBA1C 5 6 06/23/2022       Results for orders placed or performed in visit on 06/23/22   Comprehensive metabolic panel   Result Value Ref Range    Sodium 136 136 - 145 mmol/L    Potassium 4 3 3 5 - 5 3 mmol/L    Chloride 105 100 - 108 mmol/L    CO2 30 21 - 32 mmol/L    ANION GAP 1 (L) 4 - 13 mmol/L    BUN 12 5 - 25 mg/dL    Creatinine 0 85 0 60 - 1 30 mg/dL    Glucose, Fasting 97 65 - 99 mg/dL    Calcium 9 2 8 3 - 10 1 mg/dL    AST 17 5 - 45 U/L    ALT 25 12 - 78 U/L    Alkaline Phosphatase 71 46 - 116 U/L    Total Protein 7 4 6 4 - 8 2 g/dL    Albumin 3 6 3 5 - 5 0 g/dL    Total Bilirubin 0 45 0 20 - 1 00 mg/dL    eGFR 73 ml/min/1 73sq m   Lipid panel   Result Value Ref Range    Cholesterol 194 See Comment mg/dL    Triglycerides 148 See Comment mg/dL    HDL, Direct 47 (L) >=50 mg/dL    LDL Calculated 117 (H) 0 - 100 mg/dL    Non-HDL-Chol (CHOL-HDL) 147 mg/dl   TSH, 3rd generation   Result Value Ref Range    TSH 3RD GENERATON 1 680 0 450 - 4 500 uIU/mL   HEMOGLOBIN A1C W/ EAG ESTIMATION   Result Value Ref Range    Hemoglobin A1C 5 6 Normal 3 8-5 6%;  PreDiabetic 5 7-6 4%; Diabetic >=6 5%; Glycemic control for adults with diabetes <7 0% %     mg/dl       No orders of the defined types were placed in this encounter        ALLERGIES:  Allergies   Allergen Reactions    Duloxetine Anxiety     Worsened anxiety & depression    Escitalopram Anxiety     Worsening of anxiety and depressed       Current Medications     Current Outpatient Medications   Medication Sig Dispense Refill    amLODIPine-benazepril (LOTREL 5-20) 5-20 MG per capsule Take 1 capsule by mouth daily This reflects a decrease in dose as of 6/28/2022 90 capsule 1    amLODIPine-benazepril (LOTREL) 10-20 MG per capsule Take 1 capsule by mouth daily 90 capsule 3    Cholecalciferol 50 MCG (2000 UT) CAPS Take 1 capsule by mouth      hydrOXYzine HCL (ATARAX) 10 mg tablet Take 1-2 po tid prn anxiety; do NOT take atarax or vistaril with zithromax 60 tablet 0    metoprolol succinate (TOPROL-XL) 25 mg 24 hr tablet Take 1 tablet (25 mg total) by mouth daily 90 tablet 3    Multiple Vitamins-Iron (QC DAILY MULTIVITAMINS/IRON) TABS Take by mouth      simvastatin (ZOCOR) 20 mg tablet Take 1 tablet (20 mg total) by mouth daily 90 tablet 3    albuterol (PROVENTIL HFA,VENTOLIN HFA) 90 mcg/act inhaler INHALE 2 PUFFS BY MOUTH EVERY 4 HOURS AS NEEDED FOR COUGH (Patient not taking: Reported on 6/28/2022)      azithromycin (ZITHROMAX) 250 mg tablet PLEASE SEE ATTACHED FOR DETAILED DIRECTIONS (Patient not taking: Reported on 6/28/2022)      benzonatate (TESSALON PERLES) 100 mg capsule TAKE 1 CAPSULE BY MOUTH THREE TIMES A DAY AS NEEDED FOR COUGH FOR 10 DAYS (Patient not taking: Reported on 6/28/2022)      Flovent HFA 44 MCG/ACT inhaler INHALE 2 PUFF (INHALATION) 2 TIMES PER DAY FOR 10 DAYS RINSE MOUTH AFTER USE (Patient not taking: Reported on 6/28/2022)      hydrOXYzine pamoate (VISTARIL) 25 mg capsule TAKE 1 CAPSULE (25 MG TOTAL) BY MOUTH 3 (THREE) TIMES A DAY AS NEEDED FOR ANXIETY (Patient not taking: Reported on 6/28/2022) 270 capsule 1    RA Vitamin E-Vit A & D 56586 units CREA  (Patient not taking: No sig reported)       No current facility-administered medications for this visit           Health Maintenance     Health Maintenance   Topic Date Due    COVID-19 Vaccine (4 - Booster for Pfizer series) 03/12/2022    Influenza Vaccine (Season Ended) 09/01/2022    Annual Physical  04/25/2023    Breast Cancer Screening: Mammogram  05/05/2023    Depression Screening  06/02/2023    BMI: Adult  06/28/2023    Colorectal Cancer Screening  03/07/2024    Cervical Cancer Screening  08/31/2025    DTaP,Tdap,and Td Vaccines (3 - Td or Tdap) 04/27/2027    HIV Screening  Completed    Hepatitis C Screening  Completed    Pneumococcal Vaccine: Pediatrics (0 to 5 Years) and At-Risk Patients (6 to 59 Years)  Aged Out    HIB Vaccine  Aged Out    Hepatitis B Vaccine  Aged Out    IPV Vaccine  Aged Out    Hepatitis A Vaccine  Aged Out    Meningococcal ACWY Vaccine  Aged Out    HPV Vaccine  Aged [employer] History   Administered Date(s) Administered    COVID-19 PFIZER VACCINE 0 3 ML IM 03/12/2021, 04/01/2021, 11/12/2021    Influenza Injectable, MDCK, Preservative Free, Quadrivalent, 0 5 mL 12/14/2018, 10/02/2019    Influenza Quadrivalent Preservative Free 3 years and older IM 12/14/2018, 10/02/2019, 10/17/2020    Influenza, injectable, quadrivalent, preservative free 0 5 mL 10/17/2020    Tdap 03/16/2016, 04/27/2017          William Viveros DO

## 2022-09-12 ENCOUNTER — PATIENT MESSAGE (OUTPATIENT)
Dept: FAMILY MEDICINE CLINIC | Facility: CLINIC | Age: 62
End: 2022-09-12

## 2022-09-12 DIAGNOSIS — I10 HYPERTENSION, UNSPECIFIED TYPE: ICD-10-CM

## 2022-09-12 DIAGNOSIS — E78.01 FAMILIAL HYPERCHOLESTEROLEMIA: ICD-10-CM

## 2022-09-13 RX ORDER — SIMVASTATIN 20 MG
20 TABLET ORAL DAILY
Qty: 90 TABLET | Refills: 0 | Status: SHIPPED | OUTPATIENT
Start: 2022-09-13

## 2022-09-13 RX ORDER — METOPROLOL SUCCINATE 25 MG/1
25 TABLET, EXTENDED RELEASE ORAL DAILY
Qty: 90 TABLET | Refills: 0 | Status: SHIPPED | OUTPATIENT
Start: 2022-09-13

## 2022-11-30 DIAGNOSIS — E78.01 FAMILIAL HYPERCHOLESTEROLEMIA: ICD-10-CM

## 2022-11-30 DIAGNOSIS — I10 HYPERTENSION, UNSPECIFIED TYPE: ICD-10-CM

## 2022-11-30 RX ORDER — SIMVASTATIN 20 MG
TABLET ORAL
Qty: 90 TABLET | Refills: 0 | Status: SHIPPED | OUTPATIENT
Start: 2022-11-30

## 2022-11-30 RX ORDER — METOPROLOL SUCCINATE 25 MG/1
TABLET, EXTENDED RELEASE ORAL
Qty: 90 TABLET | Refills: 0 | Status: SHIPPED | OUTPATIENT
Start: 2022-11-30

## 2023-06-09 DIAGNOSIS — I10 HYPERTENSION, UNSPECIFIED TYPE: ICD-10-CM

## 2023-06-09 DIAGNOSIS — E78.01 FAMILIAL HYPERCHOLESTEROLEMIA: ICD-10-CM

## 2023-06-09 RX ORDER — SIMVASTATIN 20 MG
TABLET ORAL
Qty: 90 TABLET | Refills: 0 | Status: SHIPPED | OUTPATIENT
Start: 2023-06-09

## 2023-06-10 RX ORDER — METOPROLOL SUCCINATE 25 MG/1
TABLET, EXTENDED RELEASE ORAL
Qty: 90 TABLET | Refills: 0 | Status: SHIPPED | OUTPATIENT
Start: 2023-06-10

## 2023-06-13 ENCOUNTER — HOSPITAL ENCOUNTER (OUTPATIENT)
Dept: MAMMOGRAPHY | Facility: MEDICAL CENTER | Age: 63
Discharge: HOME | End: 2023-06-13
Payer: COMMERCIAL

## 2023-06-13 VITALS — BODY MASS INDEX: 24.49 KG/M2 | HEIGHT: 63 IN | WEIGHT: 138.23 LBS

## 2023-06-13 DIAGNOSIS — Z12.31 VISIT FOR SCREENING MAMMOGRAM: ICD-10-CM

## 2023-06-13 PROCEDURE — 77063 BREAST TOMOSYNTHESIS BI: CPT

## 2023-06-13 PROCEDURE — 77067 SCR MAMMO BI INCL CAD: CPT

## 2023-06-26 ENCOUNTER — LAB (OUTPATIENT)
Dept: LAB | Facility: CLINIC | Age: 63
End: 2023-06-26
Payer: COMMERCIAL

## 2023-06-26 DIAGNOSIS — E78.01 FAMILIAL HYPERCHOLESTEROLEMIA: ICD-10-CM

## 2023-06-26 LAB
ALBUMIN SERPL BCP-MCNC: 3.8 G/DL (ref 3.5–5)
ALP SERPL-CCNC: 73 U/L (ref 46–116)
ALT SERPL W P-5'-P-CCNC: 29 U/L (ref 12–78)
ANION GAP SERPL CALCULATED.3IONS-SCNC: 2 MMOL/L
AST SERPL W P-5'-P-CCNC: 20 U/L (ref 5–45)
BILIRUB SERPL-MCNC: 0.56 MG/DL (ref 0.2–1)
BUN SERPL-MCNC: 15 MG/DL (ref 5–25)
CALCIUM SERPL-MCNC: 9.1 MG/DL (ref 8.3–10.1)
CHLORIDE SERPL-SCNC: 110 MMOL/L (ref 96–108)
CHOLEST SERPL-MCNC: 201 MG/DL
CO2 SERPL-SCNC: 27 MMOL/L (ref 21–32)
CREAT SERPL-MCNC: 0.8 MG/DL (ref 0.6–1.3)
GFR SERPL CREATININE-BSD FRML MDRD: 78 ML/MIN/1.73SQ M
GLUCOSE P FAST SERPL-MCNC: 97 MG/DL (ref 65–99)
HDLC SERPL-MCNC: 49 MG/DL
LDLC SERPL CALC-MCNC: 124 MG/DL (ref 0–100)
NONHDLC SERPL-MCNC: 152 MG/DL
POTASSIUM SERPL-SCNC: 3.9 MMOL/L (ref 3.5–5.3)
PROT SERPL-MCNC: 7.2 G/DL (ref 6.4–8.4)
SODIUM SERPL-SCNC: 139 MMOL/L (ref 135–147)
TRIGL SERPL-MCNC: 140 MG/DL

## 2023-06-26 PROCEDURE — 80061 LIPID PANEL: CPT

## 2023-06-26 PROCEDURE — 36415 COLL VENOUS BLD VENIPUNCTURE: CPT

## 2023-06-26 PROCEDURE — 80053 COMPREHEN METABOLIC PANEL: CPT

## 2023-06-29 ENCOUNTER — OFFICE VISIT (OUTPATIENT)
Dept: FAMILY MEDICINE CLINIC | Facility: CLINIC | Age: 63
End: 2023-06-29
Payer: COMMERCIAL

## 2023-06-29 VITALS
BODY MASS INDEX: 22.82 KG/M2 | HEART RATE: 89 BPM | HEIGHT: 65 IN | OXYGEN SATURATION: 96 % | WEIGHT: 137 LBS | TEMPERATURE: 98.7 F | SYSTOLIC BLOOD PRESSURE: 118 MMHG | DIASTOLIC BLOOD PRESSURE: 80 MMHG

## 2023-06-29 DIAGNOSIS — F41.9 ANXIETY: ICD-10-CM

## 2023-06-29 DIAGNOSIS — I10 PRIMARY HYPERTENSION: ICD-10-CM

## 2023-06-29 DIAGNOSIS — Z23 NEED FOR SHINGLES VACCINE: ICD-10-CM

## 2023-06-29 DIAGNOSIS — Z00.00 PHYSICAL EXAM: Primary | ICD-10-CM

## 2023-06-29 DIAGNOSIS — I47.1 SVT (SUPRAVENTRICULAR TACHYCARDIA) (HCC): ICD-10-CM

## 2023-06-29 DIAGNOSIS — E78.01 FAMILIAL HYPERCHOLESTEROLEMIA: ICD-10-CM

## 2023-06-29 PROCEDURE — 90471 IMMUNIZATION ADMIN: CPT

## 2023-06-29 PROCEDURE — 99396 PREV VISIT EST AGE 40-64: CPT | Performed by: FAMILY MEDICINE

## 2023-06-29 PROCEDURE — 90750 HZV VACC RECOMBINANT IM: CPT

## 2023-06-29 RX ORDER — MAGNESIUM GLYCINATE 100 MG
CAPSULE ORAL
Qty: 60 CAPSULE | Refills: 0
Start: 2023-06-29

## 2023-06-29 RX ORDER — INOSITOL
POWDER (GRAM) MISCELLANEOUS
Qty: 1 G | Refills: 0
Start: 2023-06-29

## 2023-06-29 NOTE — PROGRESS NOTES
FAMILY PRACTICE OFFICE VISIT    NAME: Elie Gutierrez    AGE: 57 y o  SEX: female  : 1960   MRN: [id number]    DATE: 2023  TIME: 11:06 AM    Assessment and Plan   1  Need for shingles vaccine  shingrix today    - Zoster Vaccine Recombinant IM    2  SVT (supraventricular tachycardia) ([address] )  Has been well controlled  3  Physical exam  Colonoscopy due in   Mammogram 2023 - normal  Pap - pt will schedule  Goes to eye dr and dentist  Goes to derm for full body skin exams  shingrix      4  Primary hypertension  Well controlled  5  Familial hypercholesterolemia  Recently done 2023 - normal      6  Anxiety  Doing well with otc supplements    - Magnesium Glycinate 100 MG CAPS; 120 mg bid otc  Dispense: 60 capsule; Refill: 0  - Inositol POWD; 1/8 tsp daily; pt taking otc for anxiety  Dispense: 1 g; Refill: 0       There are no Patient Instructions on file for this visit  Chief Complaint     Chief Complaint   Patient presents with   • Physical Exam       History of Present Illness   Elie Gutierrez is a 57y o -year-old female who presents today for routine PE      Review of Systems   Review of Systems   Constitutional: Negative for chills, diaphoresis, fatigue, fever and unexpected weight change  Respiratory: Negative for cough, shortness of breath and wheezing  Cardiovascular: Negative for chest pain, palpitations and leg swelling  Gastrointestinal: Negative for abdominal pain, blood in stool, constipation, diarrhea, nausea and vomiting  No heartburn or changes in bowels  Genitourinary: Negative for dysuria, hematuria, vaginal bleeding and vaginal discharge  Allergic/Immunologic: Negative for environmental allergies  Neurological: Negative for dizziness and headaches  Psychiatric/Behavioral: Negative for dysphoric mood, self-injury, sleep disturbance and suicidal ideas  The patient is not nervous/anxious           Taking otc inositol and mag glycinate -working well for anxiety  Pt's son and 3 grandchildren moved to [place] - pt missing them a lot           Active Problem List     Patient Active Problem List   Diagnosis   • Atrophy of vagina   • Midline cystocele   • Dyspareunia   • Hyperlipidemia   • Hypertension   • Seborrheic keratosis   • Anxiety   • Precordial pain   • SVT (supraventricular tachycardia) (HCC)         Past Medical History:  Past Medical History:   Diagnosis Date   • Recurrent pregnancy loss, antepartum condition or complication    • Renal calculi    • Uterine leiomyoma     resolved 2014       Past Surgical History:  Past Surgical History:   Procedure Laterality Date   • BREAST BIOPSY Left    • DIAGNOSTIC LAPAROSCOPY     • DILATION AND CURETTAGE OF UTERUS     • HYSTEROSCOPY     • SALPINGOOPHORECTOMY Left     hysteroscopy with D&C and laparoscopy with LSO for persistent cyst  Pathology notable for benign endometrium  and benign hemorrhagic cyst of the left ovary    • TOOTH EXTRACTION         Family History:  Family History   Problem Relation Age of Onset   • Diabetes Mother    • Hypertension Mother    • Hypertension Father    • Hypertension Sister    • Hypertension Brother    • Breast cancer Maternal Aunt [de-identified]        unspecified laterality   • Breast cancer Cousin 61   • Hypertension Family    • No Known Problems Daughter    • No Known Problems Maternal Grandmother    • No Known Problems Maternal Grandfather    • No Known Problems Paternal Grandmother    • No Known Problems Paternal Grandfather    • Hypertension Sister    • No Known Problems Paternal Aunt        Social History:  Social History     Socioeconomic History   • Marital status: /Civil Union     Spouse name: Not on file   • Number of children: 2   • Years of education: Not on file   • Highest education level: Not on file   Occupational History   • Not on file   Tobacco Use   • Smoking status: Former     Types: Cigarettes     Quit date: 1984     Years since quittin 5   • Smokeless tobacco: Never   Vaping Use   • Vaping Use: Never used   Substance and Sexual Activity   • Alcohol use: Yes     Comment: social, maybe 2 drinks/ month   • Drug use: No   • Sexual activity: Not Currently   Other Topics Concern   • Not on file   Social History Narrative    Caffeine use    Gnosticism affiliation [employer]    Uses safety equipment seatbelts             Social Determinants of Health     Financial Resource Strain: Not on file   Food Insecurity: Not on file   Transportation Needs: Not on file   Physical Activity: Not on file   Stress: Not on file   Social Connections: Not on file   Intimate Partner Violence: Not on file   Housing Stability: Not on file       Objective     Vitals:    06/29/23 1047   BP: 118/80   Pulse: 89   Temp: 98 7 °F (37 1 °C)   SpO2: 96%     Wt Readings from Last 3 Encounters:   06/29/23 62 1 kg (137 lb)   06/13/23 62 7 kg (138 lb 3 7 oz)   06/28/22 62 7 kg (138 lb 3 2 oz)       Physical Exam  Vitals and nursing note reviewed  Constitutional:       General: She is not in acute distress  Appearance: Normal appearance  She is not ill-appearing or toxic-appearing  HENT:      Head: Normocephalic  Right Ear: Tympanic membrane normal       Left Ear: Tympanic membrane normal       Nose: No congestion or rhinorrhea  Mouth/Throat:      Mouth: Mucous membranes are moist       Pharynx: Oropharynx is clear  No oropharyngeal exudate or posterior oropharyngeal erythema  Comments: Mucous membranes moist and pink  No tonsillar exudates    Eyes:      General: No scleral icterus  Conjunctiva/sclera: Conjunctivae normal       Pupils: Pupils are equal, round, and reactive to light  Neck:      Vascular: No carotid bruit  Cardiovascular:      Rate and Rhythm: Normal rate and regular rhythm  Pulses: Normal pulses  Heart sounds: Normal heart sounds  No murmur heard  Pulmonary:      Effort: Pulmonary effort is normal  No respiratory distress        Breath sounds: Normal breath "sounds  No stridor  No wheezing, rhonchi or rales  Abdominal:      General: Abdomen is flat  There is no distension  Palpations: There is no mass  Tenderness: There is no abdominal tenderness  There is no guarding or rebound  Musculoskeletal:         General: No swelling, tenderness or deformity  Cervical back: Normal range of motion and neck supple  No rigidity  No muscular tenderness  Right lower leg: No edema  Left lower leg: No edema  Comments: No calf tenderness   Lymphadenopathy:      Cervical: No cervical adenopathy  Skin:     General: Skin is warm  Capillary Refill: Capillary refill takes less than 2 seconds  Coloration: Skin is not jaundiced or pale  Findings: No lesion or rash  Neurological:      General: No focal deficit present  Mental Status: She is alert and oriented to person, place, and time  Cranial Nerves: No cranial nerve deficit  Sensory: No sensory deficit  Motor: No weakness  Gait: Gait normal       Deep Tendon Reflexes: Reflexes normal    Psychiatric:         Mood and Affect: Mood normal          Behavior: Behavior normal          Thought Content:  Thought content normal          Judgment: Judgment normal          Pertinent Laboratory/Diagnostic Studies:  Lab Results   Component Value Date    BUN 15 06/26/2023    CREATININE 0 80 06/26/2023    CALCIUM 9 1 06/26/2023     04/20/2016    K 3 9 06/26/2023    CO2 27 06/26/2023     (H) 06/26/2023     Lab Results   Component Value Date    ALT 29 06/26/2023    AST 20 06/26/2023    ALKPHOS 73 06/26/2023    BILITOT 0 4 04/20/2016       Lab Results   Component Value Date    WBC 4 7 06/11/2020    HGB 14 0 06/11/2020    HCT 42 7 06/11/2020    MCV 91 6 06/11/2020     06/11/2020       No results found for: \"TSH\"    Lab Results   Component Value Date    CHOL 199 04/20/2016     Lab Results   Component Value Date    TRIG 140 06/26/2023     Lab Results   Component Value " Date    HDL 49 (L) 06/26/2023     Lab Results   Component Value Date    LDLCALC 124 (H) 06/26/2023     Lab Results   Component Value Date    HGBA1C 5 6 06/23/2022       Results for orders placed or performed in visit on 06/26/23   Comprehensive metabolic panel   Result Value Ref Range    Sodium 139 135 - 147 mmol/L    Potassium 3 9 3 5 - 5 3 mmol/L    Chloride 110 (H) 96 - 108 mmol/L    CO2 27 21 - 32 mmol/L    ANION GAP 2 mmol/L    BUN 15 5 - 25 mg/dL    Creatinine 0 80 0 60 - 1 30 mg/dL    Glucose, Fasting 97 65 - 99 mg/dL    Calcium 9 1 8 3 - 10 1 mg/dL    AST 20 5 - 45 U/L    ALT 29 12 - 78 U/L    Alkaline Phosphatase 73 46 - 116 U/L    Total Protein 7 2 6 4 - 8 4 g/dL    Albumin 3 8 3 5 - 5 0 g/dL    Total Bilirubin 0 56 0 20 - 1 00 mg/dL    eGFR 78 ml/min/1 73sq m   Lipid panel   Result Value Ref Range    Cholesterol 201 (H) See Comment mg/dL    Triglycerides 140 See Comment mg/dL    HDL, Direct 49 (L) >=50 mg/dL    LDL Calculated 124 (H) 0 - 100 mg/dL    Non-HDL-Chol (CHOL-HDL) 152 mg/dl       Orders Placed This Encounter   Procedures   • Zoster Vaccine Recombinant IM       ALLERGIES:  Allergies   Allergen Reactions   • Duloxetine Anxiety     Worsened anxiety & depression   • Escitalopram Anxiety     Worsening of anxiety and depressed       Current Medications     Current Outpatient Medications   Medication Sig Dispense Refill   • amLODIPine-benazepril (LOTREL) 10-20 MG per capsule TAKE 1 CAPSULE DAILY 90 capsule 0   • Cholecalciferol 50 MCG (2000 UT) CAPS Take 1 capsule by mouth     • metoprolol succinate (TOPROL-XL) 25 mg 24 hr tablet TAKE 1 TABLET DAILY 90 tablet 0   • Multiple Vitamins-Iron (QC DAILY MULTIVITAMINS/IRON) TABS Take by mouth     • simvastatin (ZOCOR) 20 mg tablet TAKE 1 TABLET DAILY 90 tablet 0     No current facility-administered medications for this visit           Health Maintenance     Health Maintenance   Topic Date Due   • COVID-19 Vaccine (4 - [place] series) 01/07/2022   • Annual Physical  06/28/2023   • Influenza Vaccine (Season Ended) 09/01/2023   • Colorectal Cancer Screening  03/07/2024   • Breast Cancer Screening: Mammogram  06/13/2024   • Depression Screening  06/29/2024   • BMI: Adult  06/29/2024   • Cervical Cancer Screening  08/31/2025   • DTaP,Tdap,and Td Vaccines (3 - Td or Tdap) 04/27/2027   • HIV Screening  Completed   • Hepatitis C Screening  Completed   • Pneumococcal Vaccine: Pediatrics (0 to 5 Years) and At-Risk Patients (6 to 59 Years)  Aged Out   • HIB Vaccine  Aged Out   • IPV Vaccine  Aged Out   • Hepatitis A Vaccine  Aged Out   • Meningococcal ACWY Vaccine  Aged Out   • HPV Vaccine  Aged [employer] History   Administered Date(s) Administered   • COVID-19 PFIZER VACCINE 0 3 ML IM 03/12/2021, 04/01/2021, 11/12/2021   • Influenza Injectable, MDCK, Preservative Free, Quadrivalent, 0 5 mL 12/14/2018, 10/02/2019   • Influenza Quadrivalent Preservative Free 3 years and older IM 12/14/2018, 10/02/2019, 10/17/2020   • Influenza, injectable, quadrivalent, preservative free 0 5 mL 10/17/2020   • Tdap 03/16/2016, 04/27/2017       Depression Screening and Follow-up Plan: Patient was screened for depression during today's encounter  They screened negative with a PHQ-2 score of 0          Kristyn Levine DO

## 2023-09-07 DIAGNOSIS — E78.01 FAMILIAL HYPERCHOLESTEROLEMIA: ICD-10-CM

## 2023-09-07 DIAGNOSIS — I10 ESSENTIAL HYPERTENSION: ICD-10-CM

## 2023-09-07 DIAGNOSIS — I10 HYPERTENSION, UNSPECIFIED TYPE: ICD-10-CM

## 2023-09-07 RX ORDER — METOPROLOL SUCCINATE 25 MG/1
TABLET, EXTENDED RELEASE ORAL
Qty: 90 TABLET | Refills: 0 | Status: SHIPPED | OUTPATIENT
Start: 2023-09-07

## 2023-09-07 RX ORDER — AMLODIPINE BESYLATE AND BENAZEPRIL HYDROCHLORIDE 10; 20 MG/1; MG/1
CAPSULE ORAL
Qty: 90 CAPSULE | Refills: 0 | Status: SHIPPED | OUTPATIENT
Start: 2023-09-07

## 2023-09-07 RX ORDER — SIMVASTATIN 20 MG
TABLET ORAL
Qty: 90 TABLET | Refills: 0 | Status: SHIPPED | OUTPATIENT
Start: 2023-09-07

## 2023-09-20 ENCOUNTER — OFFICE VISIT (OUTPATIENT)
Dept: GASTROENTEROLOGY | Facility: CLINIC | Age: 63
End: 2023-09-20
Payer: COMMERCIAL

## 2023-09-20 VITALS
SYSTOLIC BLOOD PRESSURE: 117 MMHG | WEIGHT: 139.4 LBS | TEMPERATURE: 99 F | BODY MASS INDEX: 24.7 KG/M2 | HEIGHT: 63 IN | OXYGEN SATURATION: 99 % | HEART RATE: 86 BPM | DIASTOLIC BLOOD PRESSURE: 72 MMHG

## 2023-09-20 DIAGNOSIS — R14.2 ERUCTATION: Primary | ICD-10-CM

## 2023-09-20 PROCEDURE — 99203 OFFICE O/P NEW LOW 30 MIN: CPT | Performed by: INTERNAL MEDICINE

## 2023-09-20 NOTE — PROGRESS NOTES
[facility] - Outpatient Consultation  Olivia Bhatti 70 y.o. female MRN: [id number]  Encounter: [id number]    HPI:    Olivia Bhatti is a 70 y.o. female who presents with complaint of frequent burping. She reports that 3 weeks ago she started feeling a slight twinge in her chest.  This would only last for a second and happens several times per day. The symptoms lasted for a week. This sensation has since dissipated, but she now has postprandial burping. The burping happens several times per day, usually after she eats. She burps 10 times in a row, then it stops. She eats fast and often talks while eating. She does not drink carbonated beverages. She does not feel bloated. The burping decreases if she eats less. She has 1 diet soda per week. She does not have GERD, dysphagia, hiccuping, early satiety, abdominal pain, nausea, or vomiting. She seldom feels bloated. She has not lost weight. She does not smoke. There is no family history of upper GI cancers. She has never had EGD. She had colonoscopy in 2019 with a 4 mm polyp in the descending colon. She is planning next colonoscopy in 2024. There is no family history of colon cancer. Burping happens   REVIEW OF SYSTEMS:  CONSTITUTIONAL: Denies any fever, chills, rigors, and weight loss. HEENT: No earache or tinnitus. Denies hearing loss or visual disturbances. CARDIOVASCULAR: No chest pain or palpitations. RESPIRATORY: Denies any cough, hemoptysis, shortness of breath or dyspnea on exertion. GASTROINTESTINAL: As noted in the History of Present Illness. GENITOURINARY: No problems with urination. Denies any hematuria or dysuria. NEUROLOGIC: No dizziness or vertigo, denies headaches. MUSCULOSKELETAL: Denies any muscle or joint pain. SKIN: Denies skin rashes or itching. ENDOCRINE: Denies excessive thirst. Denies intolerance to heat or cold. PSYCHOSOCIAL: Denies depression or anxiety.  Denies any recent memory loss.     Historical Information   Past Medical History:   Diagnosis Date   • Hypertension    • Recurrent pregnancy loss, antepartum condition or complication    • Renal calculi    • Uterine leiomyoma     resolved 2014     Past Surgical History:   Procedure Laterality Date   • BREAST BIOPSY Left    • COLONOSCOPY     • DIAGNOSTIC LAPAROSCOPY     • DILATION AND CURETTAGE OF UTERUS     • HYSTEROSCOPY     • SALPINGOOPHORECTOMY Left     hysteroscopy with D&C and laparoscopy with LSO for persistent cyst. Pathology notable for benign endometrium  and benign hemorrhagic cyst of the left ovary    • TOOTH EXTRACTION       Social History   Social History     Substance and Sexual Activity   Alcohol Use Not Currently    Comment: social, maybe 2 drinks/ month     Social History     Substance and Sexual Activity   Drug Use Never     Social History     Tobacco Use   Smoking Status Former   • Packs/day: 0.50   • Years: 8.00   • Total pack years: 4.00   • Types: Cigarettes   • Quit date: 1984   • Years since quittin.7   Smokeless Tobacco Never     Family History   Problem Relation Age of Onset   • Diabetes Mother    • Hypertension Mother    • Arthritis Mother    • Hypertension Father    • Diabetes Father    • Hypertension Sister    • Hypertension Brother    • Breast cancer Maternal Aunt 80        unspecified laterality   • Breast cancer Cousin 61   • Hypertension Family    • No Known Problems Daughter    • No Known Problems Maternal Grandmother    • No Known Problems Maternal Grandfather    • No Known Problems Paternal Grandmother    • No Known Problems Paternal Grandfather    • Hypertension Sister    • No Known Problems Paternal Aunt        Meds/Allergies       Current Outpatient Medications:   •  amLODIPine-benazepril (LOTREL) 10-20 MG per capsule  •  Cholecalciferol 50 MCG (2000) CAPS  •  Inositol POWD  •  Magnesium Glycinate 100 MG CAPS  •  metoprolol succinate (TOPROL-XL) 25 mg 24 hr tablet  •  Multiple Vitamins-Iron (QC DAILY MULTIVITAMINS/IRON) TABS  •  Probiotic Product (PROBIOTIC DAILY PO)  •  simvastatin (ZOCOR) 20 mg tablet    Allergies   Allergen Reactions   • Duloxetine Anxiety     Worsened anxiety & depression   • Escitalopram Anxiety     Worsening of anxiety and depressed       Objective   Blood pressure 117/72, pulse 86, temperature 99 °F (37.2 °C), temperature source Tympanic, height 5' 2.5" (1.588 m), weight 63.2 kg (139 lb 6.4 oz), SpO2 99 %. Body mass index is 25.09 kg/m². PHYSICAL EXAM:    General Appearance:   Alert, cooperative, no distress   HEENT:   Normocephalic, atraumatic, anicteric. Neck:  Supple, symmetrical, trachea midline   Lungs:   Clear to auscultation bilaterally; no rales, rhonchi or wheezing; respirations unlabored    Heart[de-identified]   Regular rate and rhythm; no murmur, rub, or gallop. Abdomen:   Soft, non-tender, non-distended; normal bowel sounds; no masses, no organomegaly    Genitalia:   Deferred    Rectal:   Deferred    Extremities:  No cyanosis, clubbing or edema    Pulses:  2+ and symmetric    Skin:  No jaundice, rashes, or lesions    Lymph nodes:  No palpable cervical lymphadenopathy        Lab Results:   No visits with results within 1 Day(s) from this visit.    Latest known visit with results is:   Lab on 06/26/2023   Component Date Value   • Sodium 06/26/2023 139    • Potassium 06/26/2023 3.9    • Chloride 06/26/2023 110 (H)    • CO2 06/26/2023 27    • ANION GAP 06/26/2023 2    • BUN 06/26/2023 15    • Creatinine 06/26/2023 0.80    • Glucose, Fasting 06/26/2023 97    • Calcium 06/26/2023 9.1    • AST 06/26/2023 20    • ALT 06/26/2023 29    • Alkaline Phosphatase 06/26/2023 73    • Total Protein 06/26/2023 7.2    • Albumin 06/26/2023 3.8    • Total Bilirubin 06/26/2023 0.56    • eGFR 06/26/2023 78    • Cholesterol 06/26/2023 201 (H)    • Triglycerides 06/26/2023 140    • HDL, Direct 06/26/2023 49 (L)    • LDL Calculated 06/26/2023 124 (H)    • Non-HDL-Chol (CHOL-HDL) 06/26/2023 152        Lab Results   Component Value Date    WBC 4.7 06/11/2020    HGB 14.0 06/11/2020    HCT 42.7 06/11/2020    MCV 91.6 06/11/2020     06/11/2020       Lab Results   Component Value Date     04/20/2016    SODIUM 139 06/26/2023    K 3.9 06/26/2023     (H) 06/26/2023    CO2 27 06/26/2023    AGAP 2 06/26/2023    BUN 15 06/26/2023    CREATININE 0.80 06/26/2023    GLUC 100 (H) 10/23/2020    GLUF 97 06/26/2023    CALCIUM 9.1 06/26/2023    AST 20 06/26/2023    ALT 29 06/26/2023    ALKPHOS 73 06/26/2023    PROT 6.8 04/20/2016    TP 7.2 06/26/2023    BILITOT 0.4 04/20/2016    TBILI 0.56 06/26/2023    EGFR 78 06/26/2023       No results found for: "CRP"    Lab Results   Component Value Date    KAN4TKQWIRGR 1.680 06/23/2022       Lab Results   Component Value Date    IRON 114 05/05/2016    TIBC 329 05/05/2016       Radiology Results:   No results found. ______________________________________________________________________  ASSESSMENT AND PLAN:    Jerrica Harrison is a 70 y.o. female who presents with complaint of frequent postprandial burping. She is overall healthy. There are really no red flags in her history. She has no dysphagia, odynophagia, nausea, vomiting, weight loss, GERD, early satiety, or abdominal pain. I provided reassurance and the following advice. 1.  To minimize aerophagia, she should eat slowly, chew with her mouth closed, do not talk while eating. 2.  Avoid carbonated beverages and diet beverages. 3.  Take simethicone after meals. 4.  Update me in a few weeks. If her symptoms persist, we can consider EGD. 1. Eructation        No orders of the defined types were placed in this encounter.

## 2023-12-06 DIAGNOSIS — I10 ESSENTIAL HYPERTENSION: ICD-10-CM

## 2023-12-06 DIAGNOSIS — E78.01 FAMILIAL HYPERCHOLESTEROLEMIA: ICD-10-CM

## 2023-12-06 DIAGNOSIS — I10 HYPERTENSION, UNSPECIFIED TYPE: ICD-10-CM

## 2023-12-06 RX ORDER — SIMVASTATIN 20 MG
TABLET ORAL
Qty: 90 TABLET | Refills: 0 | Status: SHIPPED | OUTPATIENT
Start: 2023-12-06

## 2023-12-06 RX ORDER — METOPROLOL SUCCINATE 25 MG/1
TABLET, EXTENDED RELEASE ORAL
Qty: 90 TABLET | Refills: 0 | Status: SHIPPED | OUTPATIENT
Start: 2023-12-06

## 2023-12-06 RX ORDER — AMLODIPINE BESYLATE AND BENAZEPRIL HYDROCHLORIDE 10; 20 MG/1; MG/1
CAPSULE ORAL
Qty: 90 CAPSULE | Refills: 0 | Status: SHIPPED | OUTPATIENT
Start: 2023-12-06

## 2024-01-04 ENCOUNTER — OFFICE VISIT (OUTPATIENT)
Dept: FAMILY MEDICINE CLINIC | Facility: CLINIC | Age: 64
End: 2024-01-04
Payer: COMMERCIAL

## 2024-01-04 VITALS
HEART RATE: 81 BPM | TEMPERATURE: 97.2 F | DIASTOLIC BLOOD PRESSURE: 70 MMHG | BODY MASS INDEX: 24.98 KG/M2 | OXYGEN SATURATION: 95 % | SYSTOLIC BLOOD PRESSURE: 110 MMHG | WEIGHT: 138.8 LBS

## 2024-01-04 DIAGNOSIS — Z23 ENCOUNTER FOR IMMUNIZATION: Primary | ICD-10-CM

## 2024-01-04 DIAGNOSIS — I47.10 SVT (SUPRAVENTRICULAR TACHYCARDIA): ICD-10-CM

## 2024-01-04 DIAGNOSIS — Z78.0 POST-MENOPAUSAL: ICD-10-CM

## 2024-01-04 DIAGNOSIS — E78.01 FAMILIAL HYPERCHOLESTEROLEMIA: ICD-10-CM

## 2024-01-04 DIAGNOSIS — I10 PRIMARY HYPERTENSION: ICD-10-CM

## 2024-01-04 PROBLEM — N81.4 PROLAPSED UTERUS: Status: ACTIVE | Noted: 2024-01-04

## 2024-01-04 PROCEDURE — 90471 IMMUNIZATION ADMIN: CPT | Performed by: FAMILY MEDICINE

## 2024-01-04 PROCEDURE — 90750 HZV VACC RECOMBINANT IM: CPT | Performed by: FAMILY MEDICINE

## 2024-01-04 PROCEDURE — 99214 OFFICE O/P EST MOD 30 MIN: CPT | Performed by: FAMILY MEDICINE

## 2024-01-04 NOTE — PATIENT INSTRUCTIONS
Consider pelvic rehab  Schedule gyn  Could consider estrace vaginal cream    Schedule dexa    Fasting labs prior to return visit

## 2024-01-04 NOTE — PROGRESS NOTES
FAMILY PRACTICE OFFICE VISIT    NAME: Park Santos    AGE: 63 y.o.   SEX: female  : 1960   MRN: [id number]    DATE: 2024  TIME: 8:17 AM    Assessment and Plan   1. Encounter for immunization  Had flu shot and covid booster.    - Zoster Vaccine Recombinant IM    2. Primary hypertension  Well controlled  Pt had tried previously    3. SVT (supraventricular tachycardia)  No symptoms.      4. Familial hypercholesterolemia  Labs prior to return visit.            Chief Complaint     Chief Complaint   Patient presents with    Follow-up     6m, no concerns        History of Present Illness   Park Santos is a 63 y.o.-year-old female who presents today for routine visit  Feeling good overall  No changes in meds since last visit.      Review of Systems   Review of Systems   Constitutional:  Negative for chills, diaphoresis, fatigue, fever and unexpected weight change.        Exercises regularly     HENT:  Negative for ear pain and sore throat.    Eyes:  Negative for pain and visual disturbance.   Respiratory:  Negative for cough, shortness of breath and wheezing.    Cardiovascular:  Negative for chest pain and palpitations.   Gastrointestinal:  Negative for abdominal pain, blood in stool, constipation, diarrhea, nausea and vomiting.        No changes in bowels  No GERD.     Genitourinary:  Negative for dysuria, hematuria and vaginal bleeding.        Occasional vaginal pressure - and dryness  Not overly bothersome     Musculoskeletal:  Negative for arthralgias and back pain.   Skin:  Negative for color change and rash.   Neurological:  Negative for seizures and syncope.   Psychiatric/Behavioral:  Negative for dysphoric mood, self-injury, sleep disturbance and suicidal ideas. The patient is not nervous/anxious.    All other systems reviewed and are negative.      Active Problem List     Patient Active Problem List   Diagnosis    Atrophy of vagina    Midline cystocele    Dyspareunia    Hyperlipidemia    Hypertension     Seborrheic keratosis    Anxiety    Precordial pain    SVT (supraventricular tachycardia)    Prolapsed uterus         Past Medical History:  Past Medical History:   Diagnosis Date    Hypertension 2000    Recurrent pregnancy loss, antepartum condition or complication     Renal calculi     Uterine leiomyoma     resolved 2014       Past Surgical History:  Past Surgical History:   Procedure Laterality Date    BREAST BIOPSY Left     COLONOSCOPY      DIAGNOSTIC LAPAROSCOPY      DILATION AND CURETTAGE OF UTERUS      HYSTEROSCOPY      SALPINGOOPHORECTOMY Left     hysteroscopy with D&C and laparoscopy with LSO for persistent cyst. Pathology notable for benign endometrium  and benign hemorrhagic cyst of the left ovary     TOOTH EXTRACTION         Family History:  Family History   Problem Relation Age of Onset    Diabetes Mother     Hypertension Mother     Arthritis Mother     Hypertension Father     Diabetes Father     Hypertension Sister     Hypertension Brother     Breast cancer Maternal Aunt 80        unspecified laterality    Breast cancer Cousin 60    Hypertension Family     No Known Problems Daughter     No Known Problems Maternal Grandmother     No Known Problems Maternal Grandfather     No Known Problems Paternal Grandmother     No Known Problems Paternal Grandfather     Hypertension Sister     No Known Problems Paternal Aunt        Social History:  Social History     Socioeconomic History    Marital status: /Civil Union     Spouse name: Not on file    Number of children: 2    Years of education: Not on file    Highest education level: Not on file   Occupational History    Not on file   Tobacco Use    Smoking status: Former     Current packs/day: 0.00     Average packs/day: 0.5 packs/day for 8.0 years (4.0 ttl pk-yrs)     Types: Cigarettes     Start date: 1976     Quit date: 1984     Years since quittin.0    Smokeless tobacco: Never   Vaping Use    Vaping status: Never Used   Substance and  Sexual Activity    Alcohol use: Not Currently     Comment: social, maybe 2 drinks/ month    Drug use: Never    Sexual activity: Not Currently   Other Topics Concern    Not on file   Social History Narrative    Caffeine use    [employer] affiliation [employer]    Uses safety equipment seatbelts             Social Determinants of Health     Financial Resource Strain: Not on file   Food Insecurity: Not on file   Transportation Needs: Not on file   Physical Activity: Not on file   Stress: Not on file   Social Connections: Not on file   Intimate Partner Violence: Not on file   Housing Stability: Not on file       Objective     Vitals:    01/04/24 0808   BP: 110/70   Pulse: 81   Temp: (!) 97.2 °F (36.2 °C)   SpO2: 95%     Wt Readings from Last 3 Encounters:   01/04/24 63 kg (138 lb 12.8 oz)   09/20/23 63.2 kg (139 lb 6.4 oz)   06/29/23 62.1 kg (137 lb)       Physical Exam  Vitals and nursing note reviewed.   Constitutional:       General: She is not in acute distress.     Appearance: Normal appearance. She is not ill-appearing or toxic-appearing.      Comments: Looks younger than stated age.     Neck:      Vascular: No carotid bruit.   Cardiovascular:      Rate and Rhythm: Normal rate and regular rhythm.      Pulses: Normal pulses.      Heart sounds: Normal heart sounds. No murmur heard.  Pulmonary:      Effort: Pulmonary effort is normal. No respiratory distress.      Breath sounds: Normal breath sounds. No wheezing, rhonchi or rales.   Abdominal:      General: There is no distension.      Palpations: Abdomen is soft. There is no mass.      Tenderness: There is no abdominal tenderness. There is no guarding or rebound.   Musculoskeletal:      Cervical back: Neck supple. No tenderness.      Right lower leg: No edema.      Left lower leg: No edema.   Lymphadenopathy:      Cervical: No cervical adenopathy.   Skin:     General: Skin is warm.      Coloration: Skin is not jaundiced.   Neurological:      General: No focal deficit  "present.      Mental Status: She is alert and oriented to person, place, and time.   Psychiatric:         Mood and Affect: Mood normal.         Behavior: Behavior normal.         Thought Content: Thought content normal.         Judgment: Judgment normal.         Pertinent Laboratory/Diagnostic Studies:  Lab Results   Component Value Date    BUN 15 06/26/2023    CREATININE 0.80 06/26/2023    CALCIUM 9.1 06/26/2023     04/20/2016    K 3.9 06/26/2023    CO2 27 06/26/2023     (H) 06/26/2023     Lab Results   Component Value Date    ALT 29 06/26/2023    AST 20 06/26/2023    ALKPHOS 73 06/26/2023    BILITOT 0.4 04/20/2016       Lab Results   Component Value Date    WBC 4.7 06/11/2020    HGB 14.0 06/11/2020    HCT 42.7 06/11/2020    MCV 91.6 06/11/2020     06/11/2020       No results found for: \"TSH\"    Lab Results   Component Value Date    CHOL 199 04/20/2016     Lab Results   Component Value Date    TRIG 140 06/26/2023     Lab Results   Component Value Date    HDL 49 (L) 06/26/2023     Lab Results   Component Value Date    LDLCALC 124 (H) 06/26/2023     Lab Results   Component Value Date    HGBA1C 5.6 06/23/2022       Results for orders placed or performed in visit on 06/26/23   Comprehensive metabolic panel   Result Value Ref Range    Sodium 139 135 - 147 mmol/L    Potassium 3.9 3.5 - 5.3 mmol/L    Chloride 110 (H) 96 - 108 mmol/L    CO2 27 21 - 32 mmol/L    ANION GAP 2 mmol/L    BUN 15 5 - 25 mg/dL    Creatinine 0.80 0.60 - 1.30 mg/dL    Glucose, Fasting 97 65 - 99 mg/dL    Calcium 9.1 8.3 - 10.1 mg/dL    AST 20 5 - 45 U/L    ALT 29 12 - 78 U/L    Alkaline Phosphatase 73 46 - 116 U/L    Total Protein 7.2 6.4 - 8.4 g/dL    Albumin 3.8 3.5 - 5.0 g/dL    Total Bilirubin 0.56 0.20 - 1.00 mg/dL    eGFR 78 ml/min/1.73sq m   Lipid panel   Result Value Ref Range    Cholesterol 201 (H) See Comment mg/dL    Triglycerides 140 See Comment mg/dL    HDL, Direct 49 (L) >=50 mg/dL    LDL Calculated 124 (H) 0 - 100 " mg/dL    Non-HDL-Chol (CHOL-HDL) 152 mg/dl       Orders Placed This Encounter   Procedures    Zoster Vaccine Recombinant IM       ALLERGIES:  Allergies   Allergen Reactions    Duloxetine Anxiety     Worsened anxiety & depression    Escitalopram Anxiety     Worsening of anxiety and depressed       Current Medications     Current Outpatient Medications   Medication Sig Dispense Refill    amLODIPine-benazepril (LOTREL) 10-20 MG per capsule TAKE 1 CAPSULE DAILY 90 capsule 0    Cholecalciferol 50 MCG (2000 UT) CAPS Take 1 capsule by mouth      Inositol POWD 1/8 tsp daily; pt taking otc for anxiety 1 g 0    Magnesium Glycinate 100 MG CAPS 120 mg bid otc 60 capsule 0    metoprolol succinate (TOPROL-XL) 25 mg 24 hr tablet TAKE 1 TABLET DAILY 90 tablet 0    Multiple Vitamins-Iron (QC DAILY MULTIVITAMINS/IRON) TABS Take by mouth      Probiotic Product (PROBIOTIC DAILY PO) Take by mouth      simvastatin (ZOCOR) 20 mg tablet TAKE 1 TABLET DAILY 90 tablet 0     No current facility-administered medications for this visit.         Health Maintenance     Health Maintenance   Topic Date Due    Osteoporosis Screening  Never done    Fall Risk  Never done    Urinary Incontinence Screening  Never done    COVID-19 Vaccine (4 - 2023-24 season) 09/01/2023    Colorectal Cancer Screening  03/07/2024    Pneumococcal Vaccine: 65+ Years (1 - PCV) 01/04/2025 (Originally 7/22/2023)    Breast Cancer Screening: Mammogram  06/13/2024    Annual Physical  06/29/2024    Depression Screening  01/04/2025    DTaP,Tdap,and Td Vaccines (3 - Td or Tdap) 04/27/2027    HIV Screening  Completed    Hepatitis C Screening  Completed    Influenza Vaccine  Completed    HIB Vaccine  Aged Out    IPV Vaccine  Aged Out    Hepatitis A Vaccine  Aged Out    Meningococcal ACWY Vaccine  Aged Out    HPV Vaccine  Aged Out     Immunization History   Administered Date(s) Administered    COVID-19 PFIZER VACCINE 0.3 ML IM 03/12/2021, 04/01/2021, 11/12/2021    INFLUENZA 10/16/2023     Influenza Injectable, MDCK, Preservative Free, Quadrivalent, 0.5 mL 12/14/2018, 10/02/2019    Influenza Quadrivalent Preservative Free 3 years and older IM 12/14/2018, 10/02/2019, 10/17/2020    Influenza, injectable, quadrivalent, preservative free 0.5 mL 10/17/2020    Tdap 03/16/2016, 04/27/2017    Zoster Vaccine Recombinant 06/29/2023       Depression Screening and Follow-up Plan: Patient was screened for depression during today's encounter. They screened negative with a PHQ-2 score of 0.        Mabel Bhatt DO

## 2024-02-27 DIAGNOSIS — I10 ESSENTIAL HYPERTENSION: ICD-10-CM

## 2024-02-27 DIAGNOSIS — I10 HYPERTENSION, UNSPECIFIED TYPE: ICD-10-CM

## 2024-02-27 DIAGNOSIS — E78.01 FAMILIAL HYPERCHOLESTEROLEMIA: ICD-10-CM

## 2024-02-27 RX ORDER — AMLODIPINE BESYLATE AND BENAZEPRIL HYDROCHLORIDE 10; 20 MG/1; MG/1
CAPSULE ORAL
Qty: 90 CAPSULE | Refills: 1 | Status: SHIPPED | OUTPATIENT
Start: 2024-02-27

## 2024-02-27 RX ORDER — METOPROLOL SUCCINATE 25 MG/1
TABLET, EXTENDED RELEASE ORAL
Qty: 90 TABLET | Refills: 1 | Status: SHIPPED | OUTPATIENT
Start: 2024-02-27

## 2024-02-27 RX ORDER — SIMVASTATIN 20 MG
TABLET ORAL
Qty: 90 TABLET | Refills: 1 | Status: SHIPPED | OUTPATIENT
Start: 2024-02-27

## 2024-04-04 ENCOUNTER — ANNUAL EXAM (OUTPATIENT)
Dept: GYNECOLOGY | Facility: CLINIC | Age: 64
End: 2024-04-04
Payer: COMMERCIAL

## 2024-04-04 VITALS
BODY MASS INDEX: 25.83 KG/M2 | WEIGHT: 140.4 LBS | DIASTOLIC BLOOD PRESSURE: 78 MMHG | SYSTOLIC BLOOD PRESSURE: 134 MMHG | HEIGHT: 62 IN

## 2024-04-04 DIAGNOSIS — Z12.31 ENCOUNTER FOR SCREENING MAMMOGRAM FOR BREAST CANCER: ICD-10-CM

## 2024-04-04 DIAGNOSIS — Z11.51 SCREENING FOR HUMAN PAPILLOMAVIRUS (HPV): ICD-10-CM

## 2024-04-04 DIAGNOSIS — Z01.419 WOMEN'S ANNUAL ROUTINE GYNECOLOGICAL EXAMINATION: Primary | ICD-10-CM

## 2024-04-04 DIAGNOSIS — N95.2 ATROPHY OF VAGINA: ICD-10-CM

## 2024-04-04 DIAGNOSIS — N81.11 MIDLINE CYSTOCELE: ICD-10-CM

## 2024-04-04 PROCEDURE — G0145 SCR C/V CYTO,THINLAYER,RESCR: HCPCS | Performed by: OBSTETRICS & GYNECOLOGY

## 2024-04-04 PROCEDURE — S0612 ANNUAL GYNECOLOGICAL EXAMINA: HCPCS | Performed by: OBSTETRICS & GYNECOLOGY

## 2024-04-04 PROCEDURE — G0476 HPV COMBO ASSAY CA SCREEN: HCPCS | Performed by: OBSTETRICS & GYNECOLOGY

## 2024-04-04 NOTE — PROGRESS NOTES
Assessment/Plan   Diagnoses and all orders for this visit:    Women's annual routine gynecological examination    Encounter for screening mammogram for breast cancer  -     Mammo screening bilateral w 3d & cad; Future    Atrophy of vagina    Midline cystocele    1. yearly exam-Pap smear done with HPV testing, self breast awareness reviewed, calcium/vitamin D recommendations discussed, mammogram request given, colonoscopy last done March 2019, has counseled next week with Dr. García, hopes to get colonoscopy done around June 2024.  DEXA scan ordered by primary doctor, encouraged to get this done.  2. vaginal atrophy-mild changes noted on exam.  Patient denies any complaints.  Vaginal lubrication/moisturizer sheet given, to use accordingly.  She denies any recent sexual activity.  3. previous adnexal tenderness/abdominal twinges-denies complaints.  Exam today is benign.  She will call or return with any issues.  4. history of uterine myoma-not noted on most recent ultrasound 10/8/2020.  Exam is benign without focal findings.  5. minimal cystocele/rectocele-exam today most consistent with laxity and no significant prolapse symptoms appreciated.  She denies any urinary or bowel movement issues.  She will call or return with issues.  6. other-retired 3 years ago.  Watches her 2 grandchildren ages 5 and 3 from her daughter Palmira, former patient.  Her son and his 3 grandchildren recently moved to [place], she and Palmira and family are considering moving down there in the next few years.  Follow-up 1 year for yearly exam or as needed.      Subjective   Patient ID: Park Santos is a 63 y.o. female.    Vitals:    04/04/24 0719   BP: 134/78     Patient was seen today for yearly exam.  Please see assessment plan for details.      The following portions of the patient's history were reviewed and updated as appropriate: allergies, current medications, past family history, past medical history, past social history, past  surgical history, and problem list.  Past Medical History:   Diagnosis Date    Hypertension     Recurrent pregnancy loss, antepartum condition or complication     Renal calculi     Uterine leiomyoma     resolved 2014     Past Surgical History:   Procedure Laterality Date    BREAST BIOPSY Left     COLONOSCOPY      DIAGNOSTIC LAPAROSCOPY      DILATION AND CURETTAGE OF UTERUS      HYSTEROSCOPY      SALPINGOOPHORECTOMY Left     hysteroscopy with D&C and laparoscopy with LSO for persistent cyst. Pathology notable for benign endometrium  and benign hemorrhagic cyst of the left ovary     TOOTH EXTRACTION       OB History    Para Term  AB Living   3 2 2   1 2   SAB IAB Ectopic Multiple Live Births   1              # Outcome Date GA Lbr Mark Anthony/2nd Weight Sex Delivery Anes PTL Lv   3 Term            2 Term            1 SAB                Current Outpatient Medications:     amLODIPine-benazepril (LOTREL) 10-20 MG per capsule, TAKE 1 CAPSULE DAILY, Disp: 90 capsule, Rfl: 1    Cholecalciferol 50 MCG (2000) CAPS, Take 1 capsule by mouth, Disp: , Rfl:     Inositol POWD, 1/8 tsp daily; pt taking otc for anxiety, Disp: 1 g, Rfl: 0    Magnesium Glycinate 100 MG CAPS, 120 mg bid otc, Disp: 60 capsule, Rfl: 0    metoprolol succinate (TOPROL-XL) 25 mg 24 hr tablet, TAKE 1 TABLET DAILY, Disp: 90 tablet, Rfl: 1    Multiple Vitamins-Iron (QC DAILY MULTIVITAMINS/IRON) TABS, Take by mouth, Disp: , Rfl:     Probiotic Product (PROBIOTIC DAILY PO), Take by mouth, Disp: , Rfl:     simvastatin (ZOCOR) 20 mg tablet, TAKE 1 TABLET DAILY, Disp: 90 tablet, Rfl: 1  Allergies   Allergen Reactions    Duloxetine Anxiety     Worsened anxiety & depression    Escitalopram Anxiety     Worsening of anxiety and depressed     Social History     Socioeconomic History    Marital status: /Civil Union     Spouse name: None    Number of children: 2    Years of education: None    Highest education level: None   Occupational History     None   Tobacco Use    Smoking status: Former     Current packs/day: 0.00     Average packs/day: 0.5 packs/day for 8.0 years (4.0 ttl pk-yrs)     Types: Cigarettes     Start date: 1976     Quit date: 1984     Years since quittin.2    Smokeless tobacco: Never   Vaping Use    Vaping status: Never Used   Substance and Sexual Activity    Alcohol use: Not Currently     Comment: social, maybe 2 drinks/ month    Drug use: Never    Sexual activity: Not Currently   Other Topics Concern    None   Social History Narrative    Caffeine use    Quaker affiliation Rastafari    Uses safety equipment seatbelts             Social Determinants of Health     Financial Resource Strain: Not on file   Food Insecurity: Not on file   Transportation Needs: Not on file   Physical Activity: Not on file   Stress: Not on file   Social Connections: Not on file   Intimate Partner Violence: Not on file   Housing Stability: Not on file     Family History   Problem Relation Age of Onset    Diabetes Mother     Hypertension Mother     Arthritis Mother     Hypertension Father     Diabetes Father     Hypertension Sister     Hypertension Brother     Breast cancer Maternal Aunt 80        unspecified laterality    Breast cancer Cousin 60    Hypertension Family     No Known Problems Daughter     No Known Problems Maternal Grandmother     No Known Problems Maternal Grandfather     No Known Problems Paternal Grandmother     No Known Problems Paternal Grandfather     Hypertension Sister     No Known Problems Paternal Aunt        Review of Systems   Constitutional:  Negative for chills, diaphoresis, fatigue and fever.   Respiratory:  Negative for apnea, cough, chest tightness, shortness of breath and wheezing.    Cardiovascular:  Negative for chest pain, palpitations and leg swelling.   Gastrointestinal:  Negative for abdominal distention, abdominal pain, anal bleeding, constipation, diarrhea, nausea, rectal pain and vomiting.   Genitourinary:   "Negative for difficulty urinating, dyspareunia, dysuria, frequency, hematuria, menstrual problem, pelvic pain, urgency, vaginal bleeding, vaginal discharge and vaginal pain.   Musculoskeletal:  Negative for arthralgias, back pain and myalgias.   Skin:  Negative for color change and rash.   Neurological:  Negative for dizziness, syncope, light-headedness, numbness and headaches.   Hematological:  Negative for adenopathy. Does not bruise/bleed easily.   Psychiatric/Behavioral:  Negative for dysphoric mood and sleep disturbance. The patient is not nervous/anxious.        Objective   Physical Exam  OBGyn Exam     Objective      /78 (BP Location: Right arm, Patient Position: Sitting)   Ht 5' 1.5\" (1.562 m)   Wt 63.7 kg (140 lb 6.4 oz)   BMI 26.10 kg/m²     General:   alert and oriented, in no acute distress   Neck: normal to inspection and palpation   Breast: normal appearance, no masses or tenderness   Heart:    Lungs:    Abdomen: soft, non-tender, without masses or organomegaly   Vulva: normal   Vagina: Mildly atrophic, without erythema or lesions or discharge.   Cervix: Mildly atrophic, without lesions or discharge or cervicitis.  No CMT.   Uterus: top normal size, anteverted, non-tender   Adnexa: no mass, fullness, tenderness   Rectum: negative    Psych:  Normal mood and affect   Skin:  Without obvious lesions   Eyes: symmetric, with normal movements and reactivity   Musculoskeletal:  Normal muscle tone and movements appreciated       "

## 2024-04-11 LAB
LAB AP GYN PRIMARY INTERPRETATION: NORMAL
Lab: NORMAL

## 2024-06-14 ENCOUNTER — HOSPITAL ENCOUNTER (OUTPATIENT)
Dept: BONE DENSITY | Facility: MEDICAL CENTER | Age: 64
Discharge: HOME | End: 2024-06-14
Payer: COMMERCIAL

## 2024-06-14 ENCOUNTER — HOSPITAL ENCOUNTER (OUTPATIENT)
Dept: MAMMOGRAPHY | Facility: MEDICAL CENTER | Age: 64
Discharge: HOME | End: 2024-06-14
Payer: COMMERCIAL

## 2024-06-14 VITALS — BODY MASS INDEX: 25.84 KG/M2 | WEIGHT: 140.43 LBS | HEIGHT: 62 IN

## 2024-06-14 DIAGNOSIS — Z12.31 ENCOUNTER FOR SCREENING MAMMOGRAM FOR BREAST CANCER: ICD-10-CM

## 2024-06-14 DIAGNOSIS — Z78.0 POST-MENOPAUSAL: ICD-10-CM

## 2024-06-14 PROCEDURE — 77080 DXA BONE DENSITY AXIAL: CPT

## 2024-06-14 PROCEDURE — 77063 BREAST TOMOSYNTHESIS BI: CPT

## 2024-06-14 PROCEDURE — 77067 SCR MAMMO BI INCL CAD: CPT

## 2024-07-08 ENCOUNTER — LAB (OUTPATIENT)
Dept: LAB | Facility: CLINIC | Age: 64
End: 2024-07-08
Payer: COMMERCIAL

## 2024-07-08 DIAGNOSIS — E78.01 FAMILIAL HYPERCHOLESTEROLEMIA: ICD-10-CM

## 2024-07-08 LAB
ALBUMIN SERPL BCG-MCNC: 4 G/DL (ref 3.5–5)
ALP SERPL-CCNC: 61 U/L (ref 34–104)
ALT SERPL W P-5'-P-CCNC: 28 U/L (ref 7–52)
ANION GAP SERPL CALCULATED.3IONS-SCNC: 12 MMOL/L (ref 4–13)
AST SERPL W P-5'-P-CCNC: 25 U/L (ref 13–39)
BILIRUB SERPL-MCNC: 0.45 MG/DL (ref 0.2–1)
BUN SERPL-MCNC: 14 MG/DL (ref 5–25)
CALCIUM SERPL-MCNC: 9.3 MG/DL (ref 8.4–10.2)
CHLORIDE SERPL-SCNC: 105 MMOL/L (ref 96–108)
CHOLEST SERPL-MCNC: 181 MG/DL
CO2 SERPL-SCNC: 25 MMOL/L (ref 21–32)
CREAT SERPL-MCNC: 0.75 MG/DL (ref 0.6–1.3)
GFR SERPL CREATININE-BSD FRML MDRD: 83 ML/MIN/1.73SQ M
GLUCOSE P FAST SERPL-MCNC: 94 MG/DL (ref 65–99)
HDLC SERPL-MCNC: 51 MG/DL
LDLC SERPL CALC-MCNC: 88 MG/DL (ref 0–100)
NONHDLC SERPL-MCNC: 130 MG/DL
POTASSIUM SERPL-SCNC: 3.9 MMOL/L (ref 3.5–5.3)
PROT SERPL-MCNC: 6.6 G/DL (ref 6.4–8.4)
SODIUM SERPL-SCNC: 142 MMOL/L (ref 135–147)
TRIGL SERPL-MCNC: 211 MG/DL

## 2024-07-08 PROCEDURE — 36415 COLL VENOUS BLD VENIPUNCTURE: CPT

## 2024-07-08 PROCEDURE — 80053 COMPREHEN METABOLIC PANEL: CPT

## 2024-07-08 PROCEDURE — 80061 LIPID PANEL: CPT

## 2024-07-11 ENCOUNTER — OFFICE VISIT (OUTPATIENT)
Dept: FAMILY MEDICINE CLINIC | Facility: CLINIC | Age: 64
End: 2024-07-11
Payer: COMMERCIAL

## 2024-07-11 VITALS
SYSTOLIC BLOOD PRESSURE: 132 MMHG | TEMPERATURE: 98.2 F | RESPIRATION RATE: 20 BRPM | DIASTOLIC BLOOD PRESSURE: 78 MMHG | WEIGHT: 138.8 LBS | HEIGHT: 62 IN | BODY MASS INDEX: 25.54 KG/M2 | OXYGEN SATURATION: 98 % | HEART RATE: 78 BPM

## 2024-07-11 DIAGNOSIS — Z00.00 PHYSICAL EXAM: Primary | ICD-10-CM

## 2024-07-11 DIAGNOSIS — M85.89 OSTEOPENIA OF MULTIPLE SITES: ICD-10-CM

## 2024-07-11 DIAGNOSIS — I10 PRIMARY HYPERTENSION: ICD-10-CM

## 2024-07-11 DIAGNOSIS — E78.2 MIXED HYPERLIPIDEMIA: ICD-10-CM

## 2024-07-11 PROCEDURE — 99387 INIT PM E/M NEW PAT 65+ YRS: CPT | Performed by: FAMILY MEDICINE

## 2024-07-11 NOTE — PROGRESS NOTES
FAMILY PRACTICE OFFICE VISIT    NAME: Park Santos    AGE: 63 y.o.   SEX: female  : 1960   MRN: [id number]    DATE: 2024  TIME: 8:25 AM    Assessment and Plan   1. Physical exam  Anticipatory guidance and preventative medicine discussed  Up to date on mammo, gyn exams and colonoscopy      2. Primary hypertension  Controled.      3. Osteopenia of multiple sites  Your DEXA scan is consistent with osteopenia   A precursor to osteoporosis.  Recommendation to avoid 'bone-robbers' - ie: excessive intake of caffeine, salt and alcohol.  Urge weight bearing activities to help strengthen bones  And recommend calcium 1000 mg daily - along with vit D3 - 1,000 - 2,000 Iu's daily (if not already doing so)        4. Mixed hyperlipidemia  Well controlled other than mild elevation in trig's  Pt reports to some dietary indiscretion prior to testing          Chief Complaint     Chief Complaint   Patient presents with    Follow-up       History of Present Illness   Park Santos is a 63 y.o.-year-old female who presents today for routine PE  Feels good overall.      Review of Systems   Review of Systems   Constitutional:  Negative for chills, diaphoresis, fatigue, fever and unexpected weight change.        Exercising regularly     Respiratory:  Negative for cough, shortness of breath and wheezing.    Cardiovascular:  Negative for chest pain and palpitations.   Gastrointestinal:  Negative for abdominal pain, blood in stool, constipation, diarrhea, nausea and vomiting.   Genitourinary:  Negative for dysuria, hematuria and vaginal bleeding.        No urinary incontinence.     Allergic/Immunologic: Negative for environmental allergies.   Neurological:  Negative for dizziness, syncope and headaches.   Psychiatric/Behavioral:  Negative for dysphoric mood, self-injury, sleep disturbance and suicidal ideas. The patient is not nervous/anxious.        Active Problem List     Patient Active Problem List   Diagnosis    Atrophy of vagina     Midline cystocele    Dyspareunia    Hyperlipidemia    Hypertension    Seborrheic keratosis    Anxiety    Precordial pain    SVT (supraventricular tachycardia)    Prolapsed uterus    Osteopenia of multiple sites         Past Medical History:  Past Medical History:   Diagnosis Date    Hypertension 2000    Recurrent pregnancy loss, antepartum condition or complication     Renal calculi     Uterine leiomyoma     resolved 06/04/2014       Past Surgical History:  Past Surgical History:   Procedure Laterality Date    BREAST EXCISIONAL BIOPSY Left     COLONOSCOPY      DIAGNOSTIC LAPAROSCOPY      DILATION AND CURETTAGE OF UTERUS      HYSTEROSCOPY      SALPINGOOPHORECTOMY Left     hysteroscopy with D&C and laparoscopy with LSO for persistent cyst. Pathology notable for benign endometrium  and benign hemorrhagic cyst of the left ovary     TOOTH EXTRACTION         Family History:  Family History   Problem Relation Age of Onset    Diabetes Mother     Hypertension Mother     Arthritis Mother     Hypertension Father     Diabetes Father     Hypertension Sister     Hypertension Sister     No Known Problems Daughter     No Known Problems Maternal Grandmother     No Known Problems Maternal Grandfather     No Known Problems Paternal Grandmother     No Known Problems Paternal Grandfather     Hypertension Brother     Breast cancer Maternal Aunt 80        unspecified laterality    No Known Problems Paternal Aunt     Breast cancer Cousin 60    Hypertension Family        Social History:  Social History     Socioeconomic History    Marital status: /Civil Union     Spouse name: Not on file    Number of children: 2    Years of education: Not on file    Highest education level: Not on file   Occupational History    Not on file   Tobacco Use    Smoking status: Former     Current packs/day: 0.00     Average packs/day: 0.5 packs/day for 8.0 years (4.0 ttl pk-yrs)     Types: Cigarettes     Start date: 1/1/1976     Quit date: 1/1/1984      Years since quittin.5    Smokeless tobacco: Never   Vaping Use    Vaping status: Never Used   Substance and Sexual Activity    Alcohol use: Not Currently     Comment: social, maybe 2 drinks/ month    Drug use: Never    Sexual activity: Not Currently   Other Topics Concern    Not on file   Social History Narrative    Caffeine use    Mandaen affiliation Sabianist    Uses safety equipment seatbelts             Social Determinants of Health     Financial Resource Strain: Not on file   Food Insecurity: Not on file   Transportation Needs: Not on file   Physical Activity: Not on file   Stress: Not on file   Social Connections: Not on file   Intimate Partner Violence: Not on file   Housing Stability: Not on file       Objective     Vitals:    24 0759   BP: 132/78   Pulse: 78   Resp: 20   Temp: 98.2 °F (36.8 °C)   SpO2: 98%     Wt Readings from Last 3 Encounters:   24 63 kg (138 lb 12.8 oz)   24 63.7 kg (140 lb 6.9 oz)   24 63.7 kg (140 lb 6.4 oz)       Physical Exam  Vitals and nursing note reviewed.   Constitutional:       General: She is not in acute distress.     Appearance: Normal appearance. She is not ill-appearing or toxic-appearing.      Comments: LOOKS YOUNGER THAN STATED AGE     HENT:      Head: Normocephalic.      Right Ear: Tympanic membrane normal.      Left Ear: Tympanic membrane normal.      Nose: No congestion or rhinorrhea.      Mouth/Throat:      Mouth: Mucous membranes are moist.      Pharynx: Oropharynx is clear. No oropharyngeal exudate or posterior oropharyngeal erythema.      Comments: Mucous membranes moist and pink  No tonsillar exudates    Eyes:      General: No scleral icterus.     Conjunctiva/sclera: Conjunctivae normal.      Pupils: Pupils are equal, round, and reactive to light.   Neck:      Vascular: No carotid bruit.   Cardiovascular:      Rate and Rhythm: Normal rate and regular rhythm.      Pulses: Normal pulses.      Heart sounds: Normal heart sounds. No  "murmur heard.  Pulmonary:      Effort: Pulmonary effort is normal. No respiratory distress.      Breath sounds: Normal breath sounds. No wheezing, rhonchi or rales.   Abdominal:      General: Abdomen is flat. There is no distension.      Palpations: There is no mass.      Tenderness: There is no abdominal tenderness. There is no guarding or rebound.   Musculoskeletal:         General: No swelling, tenderness or deformity.      Cervical back: Normal range of motion and neck supple. No rigidity. No muscular tenderness.      Right lower leg: No edema.      Left lower leg: No edema.      Comments: No calf tenderness   Lymphadenopathy:      Cervical: No cervical adenopathy.   Skin:     General: Skin is warm.      Capillary Refill: Capillary refill takes less than 2 seconds.      Coloration: Skin is not jaundiced or pale.      Findings: No lesion or rash.   Neurological:      General: No focal deficit present.      Mental Status: She is alert and oriented to person, place, and time.      Cranial Nerves: No cranial nerve deficit.      Sensory: No sensory deficit.      Motor: No weakness.      Gait: Gait normal.      Deep Tendon Reflexes: Reflexes normal.   Psychiatric:         Mood and Affect: Mood normal.         Behavior: Behavior normal.         Thought Content: Thought content normal.         Judgment: Judgment normal.         Pertinent Laboratory/Diagnostic Studies:  Lab Results   Component Value Date    BUN 14 07/08/2024    CREATININE 0.75 07/08/2024    CALCIUM 9.3 07/08/2024     04/20/2016    K 3.9 07/08/2024    CO2 25 07/08/2024     07/08/2024     Lab Results   Component Value Date    ALT 28 07/08/2024    AST 25 07/08/2024    ALKPHOS 61 07/08/2024    BILITOT 0.4 04/20/2016       Lab Results   Component Value Date    WBC 4.7 06/11/2020    HGB 14.0 06/11/2020    HCT 42.7 06/11/2020    MCV 91.6 06/11/2020     06/11/2020       No results found for: \"TSH\"    Lab Results   Component Value Date    CHOL " 199 04/20/2016     Lab Results   Component Value Date    TRIG 211 (H) 07/08/2024     Lab Results   Component Value Date    HDL 51 07/08/2024     Lab Results   Component Value Date    LDLCALC 88 07/08/2024     Lab Results   Component Value Date    HGBA1C 5.6 06/23/2022       Results for orders placed or performed in visit on 07/08/24   Comprehensive metabolic panel   Result Value Ref Range    Sodium 142 135 - 147 mmol/L    Potassium 3.9 3.5 - 5.3 mmol/L    Chloride 105 96 - 108 mmol/L    CO2 25 21 - 32 mmol/L    ANION GAP 12 4 - 13 mmol/L    BUN 14 5 - 25 mg/dL    Creatinine 0.75 0.60 - 1.30 mg/dL    Glucose, Fasting 94 65 - 99 mg/dL    Calcium 9.3 8.4 - 10.2 mg/dL    AST 25 13 - 39 U/L    ALT 28 7 - 52 U/L    Alkaline Phosphatase 61 34 - 104 U/L    Total Protein 6.6 6.4 - 8.4 g/dL    Albumin 4.0 3.5 - 5.0 g/dL    Total Bilirubin 0.45 0.20 - 1.00 mg/dL    eGFR 83 ml/min/1.73sq m   Lipid panel   Result Value Ref Range    Cholesterol 181 See Comment mg/dL    Triglycerides 211 (H) See Comment mg/dL    HDL, Direct 51 >=50 mg/dL    LDL Calculated 88 0 - 100 mg/dL    Non-HDL-Chol (CHOL-HDL) 130 mg/dl       No orders of the defined types were placed in this encounter.      ALLERGIES:  Allergies   Allergen Reactions    Duloxetine Anxiety     Worsened anxiety & depression    Escitalopram Anxiety     Worsening of anxiety and depressed       Current Medications     Current Outpatient Medications   Medication Sig Dispense Refill    amLODIPine-benazepril (LOTREL) 10-20 MG per capsule TAKE 1 CAPSULE DAILY 90 capsule 1    Cholecalciferol 50 MCG (2000 UT) CAPS Take 1 capsule by mouth      Inositol POWD 1/8 tsp daily; pt taking otc for anxiety 1 g 0    Magnesium Glycinate 100 MG CAPS 120 mg bid otc 60 capsule 0    metoprolol succinate (TOPROL-XL) 25 mg 24 hr tablet TAKE 1 TABLET DAILY 90 tablet 1    Multiple Vitamins-Iron (QC DAILY MULTIVITAMINS/IRON) TABS Take by mouth      Probiotic Product (PROBIOTIC DAILY PO) Take by mouth       simvastatin (ZOCOR) 20 mg tablet TAKE 1 TABLET DAILY 90 tablet 1     No current facility-administered medications for this visit.         Health Maintenance     Health Maintenance   Topic Date Due    RSV Vaccine Age 60+ Years (1 - 1-dose 60+ series) Never done    Influenza Vaccine (1) 09/01/2024    Pneumococcal Vaccine: 65+ Years (1 of 1 - PCV) 01/04/2025 (Originally 7/22/2023)    Colorectal Cancer Screening  01/01/2025    Fall Risk  01/04/2025    Urinary Incontinence Screening  01/04/2025    Breast Cancer Screening: Mammogram  06/14/2025    Depression Screening  07/11/2025    Annual Physical  07/11/2025    DTaP,Tdap,and Td Vaccines (3 - Td or Tdap) 04/27/2027    HIV Screening  Completed    Hepatitis C Screening  Completed    Osteoporosis Screening  Completed    Zoster Vaccine  Completed    COVID-19 Vaccine  Completed    RSV Vaccine age 0-20 Months  Aged Out    HIB Vaccine  Aged Out    IPV Vaccine  Aged Out    Hepatitis A Vaccine  Aged Out    Meningococcal ACWY Vaccine  Aged Out    HPV Vaccine  Aged Out     Immunization History   Administered Date(s) Administered    COVID-19 PFIZER VACCINE 0.3 ML IM 03/12/2021, 04/01/2021, 11/12/2021    COVID-19 Pfizer Vac BIVALENT Mike-sucrose 12 Yr+ IM 09/07/2022    COVID-19 Pfizer mRNA vacc PF mike-sucrose 12 yr and older (Comirnaty) 10/06/2023    INFLUENZA 10/16/2023    Influenza Injectable, MDCK, Preservative Free, Quadrivalent, 0.5 mL 12/14/2018, 10/02/2019    Influenza Quadrivalent Preservative Free 3 years and older IM 12/14/2018, 10/02/2019, 10/17/2020    Influenza, injectable, quadrivalent, preservative free 0.5 mL 10/17/2020    Tdap 03/16/2016, 04/27/2017    Zoster Vaccine Recombinant 06/29/2023, 01/04/2024       Depression Screening and Follow-up Plan: Patient was screened for depression during today's encounter. They screened negative with a PHQ-2 score of 0.    Lung Cancer Screening Shared Decision Making: I discussed with her that she is a candidate for lung cancer CT  screening.     The following Shared Decision-Making points were covered:  Benefits of screening were discussed, including the rates of reduction in death from lung cancer and other causes.  Harms of screening were reviewed, including false positive tests, radiation exposure levels, risks of invasive procedures, risks of complications of screening, and risk of overdiagnosis.  I counseled on the importance of adherence to annual lung cancer LDCT screening, impact of co-morbidities, and ability or willingness to undergo diagnosis and treatment.  I counseled on the importance of maintaining abstinence as a former smoker or was counseled on the importance of smoking cessation if a current smoker    Review of Eligibility Criteria: She meets all of the criteria for Lung Cancer Screening.   - She is 63 y.o.   - She has 20 pack year tobacco history and is a current smoker or has quit within the past 15 years  - She presents no signs or symptoms of lung cancer    After discussion, the patient decided to elect lung cancer screening.    PT NOT CANDIDATE FOR LUNG CANCER SCREENING WITH CT SCAN DUE TO QUITTING TOB USE OVER 20 YEARS AGO.    Mabel Bhatt, DO

## 2024-08-13 ENCOUNTER — PATIENT MESSAGE (OUTPATIENT)
Dept: FAMILY MEDICINE CLINIC | Facility: CLINIC | Age: 64
End: 2024-08-13

## 2024-08-14 DIAGNOSIS — I10 HYPERTENSION, UNSPECIFIED TYPE: ICD-10-CM

## 2024-08-14 DIAGNOSIS — I10 ESSENTIAL HYPERTENSION: ICD-10-CM

## 2024-08-14 DIAGNOSIS — E78.01 FAMILIAL HYPERCHOLESTEROLEMIA: ICD-10-CM

## 2024-08-14 RX ORDER — AMLODIPINE AND BENAZEPRIL HYDROCHLORIDE 10; 20 MG/1; MG/1
1 CAPSULE ORAL DAILY
Qty: 90 CAPSULE | Refills: 1 | Status: SHIPPED | OUTPATIENT
Start: 2024-08-14

## 2024-08-14 RX ORDER — SIMVASTATIN 20 MG
20 TABLET ORAL DAILY
Qty: 90 TABLET | Refills: 1 | Status: SHIPPED | OUTPATIENT
Start: 2024-08-14

## 2024-08-14 RX ORDER — METOPROLOL SUCCINATE 25 MG/1
25 TABLET, EXTENDED RELEASE ORAL DAILY
Qty: 90 TABLET | Refills: 1 | Status: SHIPPED | OUTPATIENT
Start: 2024-08-14

## 2024-11-11 ENCOUNTER — TELEPHONE (OUTPATIENT)
Dept: FAMILY MEDICINE CLINIC | Facility: CLINIC | Age: 64
End: 2024-11-11

## 2024-11-11 NOTE — TELEPHONE ENCOUNTER
Received fax through [facility] [employer] 11-8-24 / 11:12 AM from [facility]  Pt has pre-op appt 2-18-25 at 2:40 PM with Dr. Bhatt.  Forms were placed in Dr. Bhatt folder and put on her desk for her return to the office 11-12-24.

## 2025-01-24 ENCOUNTER — APPOINTMENT (EMERGENCY)
Dept: CT IMAGING | Facility: HOSPITAL | Age: 65
End: 2025-01-24
Payer: COMMERCIAL

## 2025-01-24 ENCOUNTER — HOSPITAL ENCOUNTER (EMERGENCY)
Facility: HOSPITAL | Age: 65
Discharge: HOME | End: 2025-01-24
Attending: EMERGENCY MEDICINE
Payer: COMMERCIAL

## 2025-01-24 VITALS
HEART RATE: 80 BPM | TEMPERATURE: 97.6 F | SYSTOLIC BLOOD PRESSURE: 118 MMHG | DIASTOLIC BLOOD PRESSURE: 70 MMHG | RESPIRATION RATE: 16 BRPM | OXYGEN SATURATION: 97 %

## 2025-01-24 DIAGNOSIS — N20.1 URETERAL STONE: Primary | ICD-10-CM

## 2025-01-24 LAB
ALBUMIN SERPL BCG-MCNC: 4.2 G/DL (ref 3.5–5)
ALP SERPL-CCNC: 73 U/L (ref 34–104)
ALT SERPL W P-5'-P-CCNC: 16 U/L (ref 7–52)
ANION GAP SERPL CALCULATED.3IONS-SCNC: 10 MMOL/L (ref 4–13)
AST SERPL W P-5'-P-CCNC: 17 U/L (ref 13–39)
BACTERIA UR QL AUTO: ABNORMAL /HPF
BASOPHILS # BLD AUTO: 0.05 THOUSANDS/ΜL (ref 0–0.1)
BASOPHILS NFR BLD AUTO: 1 % (ref 0–1)
BILIRUB SERPL-MCNC: 0.42 MG/DL (ref 0.2–1)
BILIRUB UR QL STRIP: NEGATIVE
BUN SERPL-MCNC: 17 MG/DL (ref 5–25)
CALCIUM SERPL-MCNC: 9.1 MG/DL (ref 8.4–10.2)
CAOX CRY URNS QL MICRO: ABNORMAL /HPF
CHLORIDE SERPL-SCNC: 105 MMOL/L (ref 96–108)
CLARITY UR: CLEAR
CO2 SERPL-SCNC: 24 MMOL/L (ref 21–32)
COLOR UR: YELLOW
CREAT SERPL-MCNC: 0.75 MG/DL (ref 0.6–1.3)
EOSINOPHIL # BLD AUTO: 0.22 THOUSAND/ΜL (ref 0–0.61)
EOSINOPHIL NFR BLD AUTO: 2 % (ref 0–6)
ERYTHROCYTE [DISTWIDTH] IN BLOOD BY AUTOMATED COUNT: 12.8 % (ref 11.6–15.1)
GFR SERPL CREATININE-BSD FRML MDRD: 83 ML/MIN/1.73SQ M
GLUCOSE SERPL-MCNC: 114 MG/DL (ref 65–140)
GLUCOSE UR STRIP-MCNC: NEGATIVE MG/DL
HCT VFR BLD AUTO: 41.7 % (ref 34.8–46.1)
HGB BLD-MCNC: 13.8 G/DL (ref 11.5–15.4)
HGB UR QL STRIP.AUTO: ABNORMAL
HYALINE CASTS #/AREA URNS LPF: ABNORMAL /LPF
IMM GRANULOCYTES # BLD AUTO: 0.03 THOUSAND/UL (ref 0–0.2)
IMM GRANULOCYTES NFR BLD AUTO: 0 % (ref 0–2)
KETONES UR STRIP-MCNC: ABNORMAL MG/DL
LEUKOCYTE ESTERASE UR QL STRIP: NEGATIVE
LIPASE SERPL-CCNC: 28 U/L (ref 11–82)
LYMPHOCYTES # BLD AUTO: 2.29 THOUSANDS/ΜL (ref 0.6–4.47)
LYMPHOCYTES NFR BLD AUTO: 22 % (ref 14–44)
MCH RBC QN AUTO: 29.2 PG (ref 26.8–34.3)
MCHC RBC AUTO-ENTMCNC: 33.1 G/DL (ref 31.4–37.4)
MCV RBC AUTO: 88 FL (ref 82–98)
MONOCYTES # BLD AUTO: 0.76 THOUSAND/ΜL (ref 0.17–1.22)
MONOCYTES NFR BLD AUTO: 7 % (ref 4–12)
MUCOUS THREADS UR QL AUTO: ABNORMAL
NEUTROPHILS # BLD AUTO: 6.91 THOUSANDS/ΜL (ref 1.85–7.62)
NEUTS SEG NFR BLD AUTO: 68 % (ref 43–75)
NITRITE UR QL STRIP: NEGATIVE
NON-SQ EPI CELLS URNS QL MICRO: ABNORMAL /HPF
NRBC BLD AUTO-RTO: 0 /100 WBCS
PH UR STRIP.AUTO: 5.5 [PH] (ref 4.5–8)
PLATELET # BLD AUTO: 293 THOUSANDS/UL (ref 149–390)
PMV BLD AUTO: 9.4 FL (ref 8.9–12.7)
POTASSIUM SERPL-SCNC: 3.9 MMOL/L (ref 3.5–5.3)
PROT SERPL-MCNC: 7 G/DL (ref 6.4–8.4)
PROT UR STRIP-MCNC: NEGATIVE MG/DL
RBC # BLD AUTO: 4.73 MILLION/UL (ref 3.81–5.12)
RBC #/AREA URNS AUTO: ABNORMAL /HPF
SODIUM SERPL-SCNC: 139 MMOL/L (ref 135–147)
SP GR UR STRIP.AUTO: >=1.03 (ref 1–1.03)
UROBILINOGEN UR QL STRIP.AUTO: 0.2 E.U./DL
WBC # BLD AUTO: 10.26 THOUSAND/UL (ref 4.31–10.16)
WBC #/AREA URNS AUTO: ABNORMAL /HPF

## 2025-01-24 PROCEDURE — 80053 COMPREHEN METABOLIC PANEL: CPT | Performed by: PHYSICIAN ASSISTANT

## 2025-01-24 PROCEDURE — 96361 HYDRATE IV INFUSION ADD-ON: CPT

## 2025-01-24 PROCEDURE — 36415 COLL VENOUS BLD VENIPUNCTURE: CPT | Performed by: PHYSICIAN ASSISTANT

## 2025-01-24 PROCEDURE — 96374 THER/PROPH/DIAG INJ IV PUSH: CPT

## 2025-01-24 PROCEDURE — 83690 ASSAY OF LIPASE: CPT | Performed by: PHYSICIAN ASSISTANT

## 2025-01-24 PROCEDURE — 99284 EMERGENCY DEPT VISIT MOD MDM: CPT | Performed by: PHYSICIAN ASSISTANT

## 2025-01-24 PROCEDURE — 81001 URINALYSIS AUTO W/SCOPE: CPT

## 2025-01-24 PROCEDURE — 96376 TX/PRO/DX INJ SAME DRUG ADON: CPT

## 2025-01-24 PROCEDURE — 99284 EMERGENCY DEPT VISIT MOD MDM: CPT

## 2025-01-24 PROCEDURE — 85025 COMPLETE CBC W/AUTO DIFF WBC: CPT | Performed by: PHYSICIAN ASSISTANT

## 2025-01-24 PROCEDURE — 74176 CT ABD & PELVIS W/O CONTRAST: CPT

## 2025-01-24 PROCEDURE — 96375 TX/PRO/DX INJ NEW DRUG ADDON: CPT

## 2025-01-24 RX ORDER — KETOROLAC TROMETHAMINE 30 MG/ML
15 INJECTION, SOLUTION INTRAMUSCULAR; INTRAVENOUS ONCE
Status: COMPLETED | OUTPATIENT
Start: 2025-01-24 | End: 2025-01-24

## 2025-01-24 RX ORDER — NAPROXEN 500 MG/1
500 TABLET ORAL 2 TIMES DAILY WITH MEALS
Qty: 12 TABLET | Refills: 0 | Status: SHIPPED | OUTPATIENT
Start: 2025-01-24

## 2025-01-24 RX ORDER — TAMSULOSIN HYDROCHLORIDE 0.4 MG/1
0.4 CAPSULE ORAL
Qty: 7 CAPSULE | Refills: 0 | Status: SHIPPED | OUTPATIENT
Start: 2025-01-24

## 2025-01-24 RX ORDER — HYDROCODONE BITARTRATE AND ACETAMINOPHEN 5; 325 MG/1; MG/1
1 TABLET ORAL EVERY 6 HOURS PRN
Qty: 10 TABLET | Refills: 0 | Status: SHIPPED | OUTPATIENT
Start: 2025-01-24

## 2025-01-24 RX ORDER — MORPHINE SULFATE 4 MG/ML
4 INJECTION, SOLUTION INTRAMUSCULAR; INTRAVENOUS ONCE
Status: COMPLETED | OUTPATIENT
Start: 2025-01-24 | End: 2025-01-24

## 2025-01-24 RX ORDER — ONDANSETRON 4 MG/1
4 TABLET, FILM COATED ORAL EVERY 6 HOURS
Qty: 12 TABLET | Refills: 0 | Status: SHIPPED | OUTPATIENT
Start: 2025-01-24

## 2025-01-24 RX ORDER — ONDANSETRON 2 MG/ML
4 INJECTION INTRAMUSCULAR; INTRAVENOUS ONCE
Status: COMPLETED | OUTPATIENT
Start: 2025-01-24 | End: 2025-01-24

## 2025-01-24 RX ADMIN — KETOROLAC TROMETHAMINE 15 MG: 30 INJECTION, SOLUTION INTRAMUSCULAR; INTRAVENOUS at 12:49

## 2025-01-24 RX ADMIN — MORPHINE SULFATE 2 MG: 2 INJECTION, SOLUTION INTRAMUSCULAR; INTRAVENOUS at 14:59

## 2025-01-24 RX ADMIN — MORPHINE SULFATE 4 MG: 4 INJECTION INTRAVENOUS at 12:50

## 2025-01-24 RX ADMIN — ONDANSETRON 4 MG: 2 INJECTION INTRAMUSCULAR; INTRAVENOUS at 12:49

## 2025-01-24 RX ADMIN — SODIUM CHLORIDE 1000 ML: 0.9 INJECTION, SOLUTION INTRAVENOUS at 12:49

## 2025-01-24 RX ADMIN — KETOROLAC TROMETHAMINE 15 MG: 30 INJECTION, SOLUTION INTRAMUSCULAR; INTRAVENOUS at 14:59

## 2025-01-24 NOTE — ED PROVIDER NOTES
"Time reflects when diagnosis was documented in both MDM as applicable and the Disposition within this note       Time User Action Codes Description Comment    1/24/2025  4:18 PM Leda Lundberg Add [N20.1] Ureteral stone           ED Disposition       ED Disposition   Discharge    Condition   Stable    Date/Time   Fri Jan 24, 2025  4:18 PM    Comment   Park Santos discharge to home/self care.                   Assessment & Plan       Medical Decision Making  DDx including but not limited to: renal colic, pyelonephritis, UTI, GI etiology, appendicitis, diverticulitis, pancreatitis, cholecystitis, biliary colic, AAA, rhabdomyolysis, tumor, zoster.      Plan- will check basic labs, UA, and CT renal stone study. Will give IVF, zofran, and toradol.     WBC - 10.26.   Cr .075.  urine with moderate blood; 4-10 rbc; innumerable ca oxalate shari.     CT shows: Trace fullness of the right renal collecting system. 3 mm calculus in the region of the distal right ureter may represent a distal right ureteral calculus versus a pelvic phlebolith.     Discussed all results with patient. Given her presentation and hx of stone and UA results; suspect kidney stone. Patient states medication helped the pain but states it's coming back. Will give another dose of toradol and morphine.     Secure chat sent to Madiha Lozano PA-C on call for urology regarding patient presentation and results. \"I reviewed the CT scan, it does look like a small stone at the distal ureter. As labs and vitals are overall stable, it would be appropriate for her to head home with pain medication and Flomax. A stone that size will likely pass independently within the next 24-48 hrs. We do not have any surgeons operating here over the weekend, but I would review ER protocol with her that if pain worsens, she has any signs of infection, or [facility] is [facility] She can follow " "with us outpatient as well\"    Discussed with patient. Patient feels comfortable with the plan to go home with medications and straining urine. She is feeling better and pain currently 1/10. Has a ride home. Discussed strict return precautions and patient states understanding and agrees with plan.     The management plan was discussed in detail with the patient at bedside and all questions were answered.  Prior to discharge, we provided both verbal and written instructions.  We discussed with the patient the signs and symptoms for which to return to the emergency department.  All questions were answered and patient was comfortable with the plan of care and discharged to home.  Instructed the patient to follow up with the primary care provider and/or specialist provided and their written instructions.  The patient verbalized understanding of our discussion and plan of care, and agrees to return to the Emergency Department for concerns and progression of illness.     At discharge, I instructed the patient to:  -follow up with pcp  -follow up with the recommended specialists- urology   -return to the ER if symptoms worsened or new symptoms arose  Patient agreed to this plan and was stable at time of discharge.     Amount and/or Complexity of Data Reviewed  Labs: ordered. Decision-making details documented in ED Course.  Radiology: ordered and independent interpretation performed. Decision-making details documented in ED Course.    Risk  Prescription drug management.             Medications   sodium chloride 0.9 % bolus 1,000 mL (0 mL Intravenous Stopped 1/24/25 1349)   ondansetron (ZOFRAN) injection 4 mg (4 mg Intravenous Given 1/24/25 1249)   ketorolac (TORADOL) injection 15 mg (15 mg Intravenous Given 1/24/25 1249)   morphine injection 4 mg (4 mg Intravenous Given 1/24/25 1250)   ketorolac (TORADOL) injection 15 mg (15 mg Intravenous Given 1/24/25 1459)   morphine injection 2 mg (2 mg Intravenous Given 1/24/25 1459) "       ED Risk Strat Scores                                              History of Present Illness       Chief Complaint   Patient presents with    Flank Pain     Right flank pain beginning 20 minutes pta. Hx of kidney stones.        Past Medical History:   Diagnosis Date    Hypertension 2000    Recurrent pregnancy loss, antepartum condition or complication     Renal calculi     Uterine leiomyoma     resolved 2014      Past Surgical History:   Procedure Laterality Date    BREAST EXCISIONAL BIOPSY Left     COLONOSCOPY      DIAGNOSTIC LAPAROSCOPY      DILATION AND CURETTAGE OF UTERUS      HYSTEROSCOPY      SALPINGOOPHORECTOMY Left     hysteroscopy with D&C and laparoscopy with LSO for persistent cyst. Pathology notable for benign endometrium  and benign hemorrhagic cyst of the left ovary     TOOTH EXTRACTION        Family History   Problem Relation Age of Onset    Diabetes Mother     Hypertension Mother     Arthritis Mother     Hypertension Father     Diabetes Father     Hypertension Sister     Hypertension Sister     No Known Problems Daughter     No Known Problems Maternal Grandmother     No Known Problems Maternal Grandfather     No Known Problems Paternal Grandmother     No Known Problems Paternal Grandfather     Hypertension Brother     Breast cancer Maternal Aunt 80        unspecified laterality    No Known Problems Paternal Aunt     Breast cancer Cousin 60    Hypertension Family       Social History     Tobacco Use    Smoking status: Former     Current packs/day: 0.00     Average packs/day: 0.5 packs/day for 8.0 years (4.0 ttl pk-yrs)     Types: Cigarettes     Start date: 1976     Quit date: 1984     Years since quittin.0    Smokeless tobacco: Never   Vaping Use    Vaping status: Never Used   Substance Use Topics    Alcohol use: Not Currently     Comment: social, maybe 2 drinks/ month    Drug use: Never      E-Cigarette/Vaping    E-Cigarette Use Never User       E-Cigarette/Vaping Substances     Nicotine No     THC No     CBD No     Flavoring No     Other No     Unknown No       I have reviewed and agree with the history as documented.     Patient is a 64-year-old female the past medical history of hypertension, kidney stones who presents for evaluation of right flank pain.  Patient states pain started about 20 minutes prior to arrival.  She describes as sharp flank pain that comes in waves.  Pain can radiate to right upper abdomen. She states that she is starting with some nausea now.  She states this feels similar to previous kidney stones.  She has not taken anything for pain.  No injury or trauma.  Denies fever, chills, chest pain, shortness of breath, dysuria, hematuria, frequency, vomiting, diarrhea.        Review of Systems   Constitutional:  Negative for chills and fever.   Eyes:  Negative for visual disturbance.   Respiratory:  Negative for shortness of breath.    Cardiovascular:  Negative for chest pain.   Gastrointestinal:  Positive for abdominal pain and nausea. Negative for diarrhea and vomiting.   Genitourinary:  Positive for flank pain. Negative for decreased urine volume, difficulty urinating, dysuria and hematuria.   Musculoskeletal:  Positive for back pain. Negative for arthralgias.   Skin:  Negative for color change and rash.   Neurological:  Negative for syncope, weakness and numbness.   All other systems reviewed and are negative.          Objective       ED Triage Vitals [01/24/25 1204]   Temperature Pulse Blood Pressure Respirations SpO2 Patient Position - Orthostatic VS   97.6 °F (36.4 °C) (!) 116 156/75 20 99 % Sitting      Temp Source Heart Rate Source BP Location FiO2 (%) Pain Score    Oral Monitor Left arm -- 7      Vitals      Date and Time Temp Pulse SpO2 Resp BP Pain Score FACES Pain Rating User   01/24/25 1505 -- 80 97 % 16 118/70 -- -- DR   01/24/25 1459 -- -- -- -- -- 5 -- DR   01/24/25 1444 -- -- -- -- 118/70 -- -- EM   01/24/25 1442 -- 85 99 % -- -- -- -- EM   01/24/25  1249 -- -- -- -- -- 7 --    01/24/25 1204 97.6 °F (36.4 °C) 116 99 % 20 156/75 7 -- KG            Physical Exam  Vitals and nursing note reviewed.   Constitutional:       General: She is not in acute distress.     Appearance: She is well-developed.   HENT:      Head: Normocephalic and atraumatic.      Right Ear: External ear normal.      Left Ear: External ear normal.      Nose: Nose normal.   Eyes:      Conjunctiva/sclera: Conjunctivae normal.   Cardiovascular:      Rate and Rhythm: Normal rate and regular rhythm.      Heart sounds: Normal heart sounds. No murmur heard.  Pulmonary:      Effort: Pulmonary effort is normal. No respiratory distress.      Breath sounds: Normal breath sounds. No stridor. No wheezing, rhonchi or rales.   Abdominal:      General: Abdomen is flat. Bowel sounds are normal. There is no distension.      Palpations: Abdomen is soft.      Tenderness: There is no abdominal tenderness. There is right CVA tenderness. There is no left CVA tenderness.       Musculoskeletal:         General: No swelling.      Cervical back: Normal range of motion.   Skin:     General: Skin is warm and dry.      Capillary Refill: Capillary refill takes less than 2 seconds.   Neurological:      Mental Status: She is alert.   Psychiatric:         Mood and Affect: Mood normal.         Results Reviewed       Procedure Component Value Units Date/Time    Urine Microscopic [[id number]]  (Abnormal) Collected: 01/24/25 1334    Lab Status: Final result Specimen: Urine, Clean Catch Updated: 01/24/25 1447     RBC, UA 4-10 /hpf      WBC, UA 1-2 /hpf      Epithelial Cells Occasional /hpf      Bacteria, UA Occasional /hpf      MUCUS THREADS Occasional     Hyaline Casts, UA 0-3 /lpf      Ca Oxalate Jacquie, UA Innumerable /hpf     Urine Macroscopic, POC [[id number]]  (Abnormal) Collected: 01/24/25 1334    Lab Status: Final result Specimen: Urine Updated: 01/24/25 1335     Color, UA Yellow     Clarity, UA Clear     pH, UA 5.5      Leukocytes, UA Negative     Nitrite, UA Negative     Protein, UA Negative mg/dl      Glucose, UA Negative mg/dl      Ketones, UA Trace mg/dl      Urobilinogen, UA 0.2 E.U./dl      Bilirubin, UA Negative     Occult Blood, UA Moderate     Specific Gravity, UA >=1.030    Narrative:      [facility] RESULT    Comprehensive metabolic panel [[id number]] Collected: 01/24/25 1252    Lab Status: Final result Specimen: Blood from Arm, Right Updated: 01/24/25 1316     Sodium 139 mmol/L      Potassium 3.9 mmol/L      Chloride 105 mmol/L      CO2 24 mmol/L      ANION GAP 10 mmol/L      BUN 17 mg/dL      Creatinine 0.75 mg/dL      Glucose 114 mg/dL      Calcium 9.1 mg/dL      AST 17 U/L      ALT 16 U/L      Alkaline Phosphatase 73 U/L      Total Protein 7.0 g/dL      Albumin 4.2 g/dL      Total Bilirubin 0.42 mg/dL      eGFR 83 ml/min/1.73sq m     Narrative:      National Kidney Disease Foundation guidelines for Chronic Kidney Disease (CKD):     Stage 1 with normal or high GFR (GFR > 90 mL/min/1.73 square meters)    Stage 2 Mild CKD (GFR = 60-89 mL/min/1.73 square meters)    Stage 3A Moderate CKD (GFR = 45-59 mL/min/1.73 square meters)    Stage 3B Moderate CKD (GFR = 30-44 mL/min/1.73 square meters)    Stage 4 Severe CKD (GFR = 15-29 mL/min/1.73 square meters)    Stage 5 End Stage CKD (GFR <15 mL/min/1.73 square meters)  Note: GFR calculation is accurate only with a steady state creatinine    Lipase [[id number]]  (Normal) Collected: 01/24/25 1252    Lab Status: Final result Specimen: Blood from Arm, Right Updated: 01/24/25 1316     Lipase 28 u/L     CBC and differential [[id number]]  (Abnormal) Collected: 01/24/25 1252    Lab Status: Final result Specimen: Blood from Arm, Right Updated: 01/24/25 1258     WBC 10.26 Thousand/uL      RBC 4.73 Million/uL      Hemoglobin 13.8 g/dL      Hematocrit 41.7 %      MCV 88 fL      MCH 29.2 pg      MCHC 33.1 g/dL      RDW 12.8 %      MPV 9.4 fL      Platelets 293 Thousands/uL      nRBC 0 /100 WBCs       Segmented % 68 %      Immature Grans % 0 %      Lymphocytes % 22 %      Monocytes % 7 %      Eosinophils Relative 2 %      Basophils Relative 1 %      Absolute Neutrophils 6.91 Thousands/µL      Absolute Immature Grans 0.03 Thousand/uL      Absolute Lymphocytes 2.29 Thousands/µL      Absolute Monocytes 0.76 Thousand/µL      Eosinophils Absolute 0.22 Thousand/µL      Basophils Absolute 0.05 Thousands/µL             CT renal stone study abdomen pelvis without contrast   Final Interpretation by Lukas Schuler MD ( [id number])      Trace fullness of the right renal collecting system. 3 mm calculus in the region of the distal right ureter may represent a distal right ureteral calculus versus a pelvic phlebolith.         Workstation performed: [id number]             Procedures    ED Medication and Procedure Management   Prior to Admission Medications   Prescriptions Last Dose Informant Patient Reported? Taking?   Cholecalciferol 50 MCG (2000) CAPS  Self Yes No   Sig: Take 1 capsule by mouth   Inositol POWD  Self No No   Si/8 tsp daily; pt taking otc for anxiety   Magnesium Glycinate 100 MG CAPS  Self No No   Si mg bid otc   Multiple Vitamins-Iron (QC DAILY MULTIVITAMINS/IRON) TABS  Self Yes No   Sig: Take by mouth   Probiotic Product (PROBIOTIC DAILY PO)  Self Yes No   Sig: Take by mouth   amLODIPine-benazepril (LOTREL) 10-20 MG per capsule   No No   Sig: Take 1 capsule by mouth daily   metoprolol succinate (TOPROL-XL) 25 mg 24 hr tablet   No No   Sig: Take 1 tablet (25 mg total) by mouth daily   simvastatin (ZOCOR) 20 mg tablet   No No   Sig: Take 1 tablet (20 mg total) by mouth daily      Facility-Administered Medications: None     Patient's Medications   Discharge Prescriptions    HYDROCODONE-ACETAMINOPHEN (NORCO) 5-325 MG PER TABLET    Take 1 tablet by mouth every 6 (six) hours as needed for pain Max Daily Amount: 4 tablets       Start Date: 2025 End Date: --       Order Dose: 1 tablet        Quantity: 10 tablet    Refills: 0    NAPROXEN (EC NAPROSYN) 500 MG EC TABLET    Take 1 tablet (500 mg total) by mouth 2 (two) times a day with meals       Start Date: 1/24/2025 End Date: --       Order Dose: 500 mg       Quantity: 12 tablet    Refills: 0    ONDANSETRON (ZOFRAN) 4 MG TABLET    Take 1 tablet (4 mg total) by mouth every 6 (six) hours       Start Date: 1/24/2025 End Date: --       Order Dose: 4 mg       Quantity: 12 tablet    Refills: 0    TAMSULOSIN (FLOMAX) 0.4 MG    Take 1 capsule (0.4 mg total) by mouth daily with dinner       Start Date: 1/24/2025 End Date: --       Order Dose: 0.4 mg       Quantity: 7 capsule    Refills: 0     No discharge procedures on file.  ED SEPSIS DOCUMENTATION   Time reflects when diagnosis was documented in both MDM as applicable and the Disposition within this note       Time User Action Codes Description Comment    1/24/2025  4:18 PM Leda Lundberg Add [N20.1] Ureteral stone                  Leda Lundberg PA-C  01/24/25 4438

## 2025-02-18 ENCOUNTER — CONSULT (OUTPATIENT)
Dept: FAMILY MEDICINE CLINIC | Facility: CLINIC | Age: 65
End: 2025-02-18
Payer: COMMERCIAL

## 2025-02-18 VITALS
TEMPERATURE: 97.8 F | OXYGEN SATURATION: 99 % | WEIGHT: 141 LBS | BODY MASS INDEX: 26.21 KG/M2 | HEART RATE: 76 BPM | SYSTOLIC BLOOD PRESSURE: 114 MMHG | DIASTOLIC BLOOD PRESSURE: 72 MMHG

## 2025-02-18 DIAGNOSIS — E78.01 FAMILIAL HYPERCHOLESTEROLEMIA: ICD-10-CM

## 2025-02-18 DIAGNOSIS — I10 HYPERTENSION, UNSPECIFIED TYPE: ICD-10-CM

## 2025-02-18 DIAGNOSIS — I10 PRIMARY HYPERTENSION: ICD-10-CM

## 2025-02-18 DIAGNOSIS — E78.2 MIXED HYPERLIPIDEMIA: ICD-10-CM

## 2025-02-18 DIAGNOSIS — N20.0 RENAL CALCULI: ICD-10-CM

## 2025-02-18 DIAGNOSIS — I10 ESSENTIAL HYPERTENSION: ICD-10-CM

## 2025-02-18 DIAGNOSIS — H25.9 AGE-RELATED CATARACT OF BOTH EYES, UNSPECIFIED AGE-RELATED CATARACT TYPE: Primary | ICD-10-CM

## 2025-02-18 DIAGNOSIS — I47.10 SVT (SUPRAVENTRICULAR TACHYCARDIA) (HCC): ICD-10-CM

## 2025-02-18 PROCEDURE — 99214 OFFICE O/P EST MOD 30 MIN: CPT | Performed by: FAMILY MEDICINE

## 2025-02-18 RX ORDER — METOPROLOL SUCCINATE 25 MG/1
25 TABLET, EXTENDED RELEASE ORAL DAILY
Qty: 90 TABLET | Refills: 1 | Status: SHIPPED | OUTPATIENT
Start: 2025-02-18

## 2025-02-18 RX ORDER — SIMVASTATIN 20 MG
20 TABLET ORAL DAILY
Qty: 90 TABLET | Refills: 1 | Status: SHIPPED | OUTPATIENT
Start: 2025-02-18

## 2025-02-18 RX ORDER — AMLODIPINE AND BENAZEPRIL HYDROCHLORIDE 10; 20 MG/1; MG/1
1 CAPSULE ORAL DAILY
Qty: 90 CAPSULE | Refills: 1 | Status: SHIPPED | OUTPATIENT
Start: 2025-02-18

## 2025-02-18 NOTE — PROGRESS NOTES
Name: Park Santos      : 1960      MRN: [id number]  Encounter Provider: Mabel Bhatt DO  Encounter Date: 2025   Encounter department: [facility]  :  Assessment & Plan  Age-related cataract of both eyes, unspecified age-related cataract type  Formc omplted and will attach cc of meds  Will fax to eye ddr - dr garrett schmidt.  Pt is low risk for planned surgical procedure.  RCRI score of ZERO.         Primary hypertension  Good control.         Mixed hyperlipidemia    Orders:    Lipid panel; Future      Patient Instructions   Hold vitamins and supplements X 1 week prior to surgery        You would be due for labs summer 2025      Good luck with surgery!             History of Present Illness   Pre-op Exam    Pre-operative Risk Factors:    History of cerebrovascular disease: No    History of ischemic heart disease: No  Pre-operative treatment with insulin: No  Pre-operative creatinine >2 mg/dL: No    History of congestive heart failure: No    Here for pre- op for b/l cataract surgeries  Right eye 3/6/2025  And left eye 3/13/2025   With dr garrett schmidt    No h/o problems with anesthesia  Planned anesthesia - topical MAC  Surgeries will be at the [facility]    Review of Systems    Objective   /72   Pulse 76   Temp 97.8 °F (36.6 °C)   Wt 64 kg (141 lb)   SpO2 99%   BMI 26.21 kg/m²      Physical Exam  Vitals and nursing note reviewed.   Constitutional:       General: She is not in acute distress.     Appearance: Normal appearance. She is not ill-appearing or toxic-appearing.   HENT:      Right Ear: Tympanic membrane normal.      Left Ear: Tympanic membrane normal.      Nose: Nose normal.      Mouth/Throat:      Mouth: Mucous membranes are moist.      Pharynx: No oropharyngeal exudate or posterior oropharyngeal erythema.   Eyes:      General: No scleral icterus.     Extraocular Movements: Extraocular movements intact.      Pupils: Pupils are equal, round, and  reactive to light.   Neck:      Vascular: No carotid bruit.   Cardiovascular:      Rate and Rhythm: Normal rate and regular rhythm.      Pulses: Normal pulses.      Heart sounds: Normal heart sounds. No murmur heard.  Pulmonary:      Effort: Pulmonary effort is normal. No respiratory distress.      Breath sounds: Normal breath sounds. No wheezing, rhonchi or rales.   Abdominal:      General: There is no distension.      Palpations: Abdomen is soft. There is no mass.      Tenderness: There is no abdominal tenderness. There is no guarding or rebound.   Musculoskeletal:      Cervical back: Neck supple. No tenderness.      Right lower leg: No edema.      Left lower leg: No edema.   Lymphadenopathy:      Cervical: No cervical adenopathy.   Neurological:      General: No focal deficit present.      Mental Status: She is alert and oriented to person, place, and time.   Psychiatric:         Mood and Affect: Mood normal.         Behavior: Behavior normal.         Thought Content: Thought content normal.         Judgment: Judgment normal.

## 2025-02-18 NOTE — PATIENT INSTRUCTIONS
Hold vitamins and supplements X 1 week prior to surgery        You would be due for labs summer 2025      Good luck with surgery!

## 2025-04-10 ENCOUNTER — OFFICE VISIT (OUTPATIENT)
Dept: OBGYN CLINIC | Facility: MEDICAL CENTER | Age: 65
End: 2025-04-10
Payer: COMMERCIAL

## 2025-04-10 ENCOUNTER — APPOINTMENT (OUTPATIENT)
Dept: RADIOLOGY | Facility: MEDICAL CENTER | Age: 65
End: 2025-04-10
Payer: COMMERCIAL

## 2025-04-10 VITALS — BODY MASS INDEX: 25.91 KG/M2 | WEIGHT: 140.8 LBS | HEIGHT: 62 IN

## 2025-04-10 DIAGNOSIS — M25.551 PAIN IN RIGHT HIP: ICD-10-CM

## 2025-04-10 DIAGNOSIS — M76.31 IT BAND SYNDROME, RIGHT: Primary | ICD-10-CM

## 2025-04-10 PROCEDURE — 99204 OFFICE O/P NEW MOD 45 MIN: CPT | Performed by: ORTHOPAEDIC SURGERY

## 2025-04-10 PROCEDURE — 73502 X-RAY EXAM HIP UNI 2-3 VIEWS: CPT

## 2025-04-10 NOTE — PROGRESS NOTES
Name: Park Santos      : 1960      MRN: [id number]  Encounter Provider: Maryanne Santana DO  Encounter Date: 4/10/2025   Encounter department: [facility]  :  Assessment & Plan  It band syndrome, right    Patient has mild right hip osteoarthritis with IT band syndrome  X-rays at today's visit demonstrated questionable lateral view with a cam lesion, we may possibly repeat right hip x-rays in the future to obtain a better lateral view.  Treatment options were reviewed with patient today.  Medications: Can take Tylenol 1,000mg by mouth every 8 hours as needed for pain.  Do not exceed 3,000mg per day.  Patient may continue OTC medications as needed.  Ice, heat, topical gels as needed.  Physical therapy referral was given to patient today.  Activity: Continue activity as tolerated.   Plan for next appt: Follow-up in 8 weeks for reevaluation discussed. Patient is moving to [place] in 2 months, discussed she may follow up with a orthopedic surgeon there once she moves. Discussed with patient that she may follow up sooner before she moves.  If patient does not have symptomatic relief we can possibly consider an MRI of right hip in the future.   Orders:    XR hip/pelv 2-3 vws right if performed; Future    Ambulatory Referral to Physical Therapy; Future      Return in about 8 weeks (around 2025).    I answered all of the patient's questions during the visit and provided education of the patient's condition during the visit.  The patient verbalized understanding of the information given and agrees with the plan.  This note was dictated using [employer] software.  It may contain errors including improperly dictated words.  Please contact physician directly for any questions.      History of Present Illness   HPI  Chief complaint:   Chief Complaint   Patient presents with    Right Hip - Pain     Fell around [place]. Has noticed decreased mobility on R hip around six weeks ago.  Aches at night time        HPI: Park Santos is a 64 y.o. female that c/o right hip pain.    Length of time hip pain has been present: 6 weeks  Any falls or trauma associated with onset of pain: fell on side during chirstmass bue  Location of pain: lateral   Does the pain radiate?: referred pain to the knee  Intermittent or constant: Intermittent  Description of pain: Dull and achy  Aggravating factors: Night pain and occasional steps  Instability?:  Denies instability  Pain medication that has been tried: Occasional Tylenol and OTC medications.  Prefers not to take it due to personal preference  Topical mediation that has been tried: No  Has heat/ice been tried: No  Can NSAIDs be taken?  If not why?:  Yes  Has PT or home exercises been tried?:  No  Have steroid injections been tried?  No  Any history of surgery on that hip?:  No     ROS:    See HPI for musculoskeletal review.   All other systems reviewed are negative     Historical Information   Past Medical History:   Diagnosis Date    Hypertension 2000    Recurrent pregnancy loss, antepartum condition or complication     Renal calculi     Uterine leiomyoma     resolved 06/04/2014     Past Surgical History:   Procedure Laterality Date    BREAST EXCISIONAL BIOPSY Left     COLONOSCOPY      DIAGNOSTIC LAPAROSCOPY      DILATION AND CURETTAGE OF UTERUS      HYSTEROSCOPY      SALPINGOOPHORECTOMY Left     hysteroscopy with D&C and laparoscopy with LSO for persistent cyst. Pathology notable for benign endometrium  and benign hemorrhagic cyst of the left ovary     TOOTH EXTRACTION       Social History   Social History     Substance and Sexual Activity   Alcohol Use Not Currently    Comment: social, maybe 2 drinks/ month     Social History     Substance and Sexual Activity   Drug Use Never     Social History     Tobacco Use   Smoking Status Former    Current packs/day: 0.00    Average packs/day: 0.5 packs/day for 8.0 years (4.0 ttl pk-yrs)    Types: Cigarettes    Start date:  "1976    Quit date: 1984    Years since quittin.3   Smokeless Tobacco Never     Family History:   Family History   Problem Relation Age of Onset    Diabetes Mother     Hypertension Mother     Arthritis Mother     Hypertension Father     Diabetes Father     Hypertension Sister     Hypertension Sister     No Known Problems Daughter     No Known Problems Maternal Grandmother     No Known Problems Maternal Grandfather     No Known Problems Paternal Grandmother     No Known Problems Paternal Grandfather     Hypertension Brother     Breast cancer Maternal Aunt 80        unspecified laterality    No Known Problems Paternal Aunt     Breast cancer Cousin 60    Hypertension Family        Current Outpatient Medications on File Prior to Visit   Medication Sig Dispense Refill    amLODIPine-benazepril (LOTREL) 10-20 MG per capsule Take 1 capsule by mouth daily 90 capsule 1    Cholecalciferol 50 MCG ( UT) CAPS Take 1 capsule by mouth      Inositol POWD 1/8 tsp daily; pt taking otc for anxiety 1 g 0    Magnesium Glycinate 100 MG CAPS 120 mg bid otc 60 capsule 0    metoprolol succinate (TOPROL-XL) 25 mg 24 hr tablet Take 1 tablet (25 mg total) by mouth daily 90 tablet 1    Multiple Vitamins-Iron (QC DAILY MULTIVITAMINS/IRON) TABS Take by mouth      naproxen (EC NAPROSYN) 500 MG EC tablet Take 1 tablet (500 mg total) by mouth 2 (two) times a day with meals 12 tablet 0    Probiotic Product (PROBIOTIC DAILY PO) Take by mouth      simvastatin (ZOCOR) 20 mg tablet Take 1 tablet (20 mg total) by mouth daily 90 tablet 1     No current facility-administered medications on file prior to visit.     Allergies   Allergen Reactions    Duloxetine Anxiety     Worsened anxiety & depression    Escitalopram Anxiety     Worsening of anxiety and depressed           Objective   Ht 5' 1.5\" (1.562 m)   Wt 63.9 kg (140 lb 12.8 oz)   BMI 26.17 kg/m²        PE:  AAOx 3  WDWN  Hearing intact, no drainage from eyes  Regular rate  no audible " wheezing  no abdominal distension  LE compartments soft, skin intact    Ortho Exam:  right hip:   No dislocation/deformity  negative [facility]  ROM: 105 flexion, 45 external rotation, 20 internal rotation  negative Prakash Test  negative Impingement test  No  TTP over greater trochanter  Abduction: 5/5  negative Estela's test  No  TTP over SIJ  IT band tightness    rightLE:    Sensation grossly intact   Palpable 2+ pulse  AT/GS intact    Back:    No  TTP over lumbar spinous processes, paraspinal musculature  negative SLR    Imaging Studies: Results Review Statement: I personally reviewed the following image studies in PACS and associated radiology reports: xray(s). My interpretation of the radiology images/reports is: Mild right hip osteoarthritis, questionable lateral view, cam lesion.    Procedures     Scribe Attestation      I,:  Paresh Arias am acting as a scribe while in the presence of the attending physician.:       I,:  Maryanne Santana DO personally performed the services described in this documentation    as scribed in my presence.:

## 2025-06-05 ENCOUNTER — APPOINTMENT (OUTPATIENT)
Dept: LAB | Facility: CLINIC | Age: 65
End: 2025-06-05
Payer: COMMERCIAL

## 2025-06-05 ENCOUNTER — RESULTS FOLLOW-UP (OUTPATIENT)
Dept: FAMILY MEDICINE CLINIC | Facility: CLINIC | Age: 65
End: 2025-06-05

## 2025-06-05 DIAGNOSIS — E78.2 MIXED HYPERLIPIDEMIA: ICD-10-CM

## 2025-06-05 LAB
CHOLEST SERPL-MCNC: 185 MG/DL (ref ?–200)
HDLC SERPL-MCNC: 53 MG/DL
LDLC SERPL CALC-MCNC: 95 MG/DL (ref 0–100)
NONHDLC SERPL-MCNC: 132 MG/DL
TRIGL SERPL-MCNC: 184 MG/DL (ref ?–150)

## 2025-06-05 PROCEDURE — 80061 LIPID PANEL: CPT

## 2025-06-05 PROCEDURE — 36415 COLL VENOUS BLD VENIPUNCTURE: CPT
